# Patient Record
Sex: FEMALE | Race: WHITE | Employment: OTHER | URBAN - METROPOLITAN AREA
[De-identification: names, ages, dates, MRNs, and addresses within clinical notes are randomized per-mention and may not be internally consistent; named-entity substitution may affect disease eponyms.]

---

## 2017-01-01 ENCOUNTER — TELEPHONE (OUTPATIENT)
Dept: FAMILY MEDICINE CLINIC | Facility: CLINIC | Age: 82
End: 2017-01-01

## 2017-01-01 ENCOUNTER — OFFICE VISIT (OUTPATIENT)
Dept: FAMILY MEDICINE CLINIC | Facility: CLINIC | Age: 82
End: 2017-01-01

## 2017-01-01 ENCOUNTER — APPOINTMENT (OUTPATIENT)
Dept: LAB | Age: 82
End: 2017-01-01
Attending: FAMILY MEDICINE
Payer: MEDICARE

## 2017-01-01 ENCOUNTER — APPOINTMENT (OUTPATIENT)
Dept: GENERAL RADIOLOGY | Facility: HOSPITAL | Age: 82
DRG: 871 | End: 2017-01-01
Attending: INTERNAL MEDICINE
Payer: MEDICARE

## 2017-01-01 ENCOUNTER — APPOINTMENT (OUTPATIENT)
Dept: GENERAL RADIOLOGY | Facility: HOSPITAL | Age: 82
End: 2017-01-01
Attending: EMERGENCY MEDICINE
Payer: MEDICARE

## 2017-01-01 ENCOUNTER — APPOINTMENT (OUTPATIENT)
Dept: GENERAL RADIOLOGY | Facility: HOSPITAL | Age: 82
DRG: 871 | End: 2017-01-01
Attending: EMERGENCY MEDICINE
Payer: MEDICARE

## 2017-01-01 ENCOUNTER — SNF VISIT (OUTPATIENT)
Dept: INTERNAL MEDICINE CLINIC | Age: 82
End: 2017-01-01

## 2017-01-01 ENCOUNTER — HOSPITAL ENCOUNTER (INPATIENT)
Facility: HOSPITAL | Age: 82
LOS: 1 days | DRG: 871 | End: 2017-01-01
Attending: EMERGENCY MEDICINE | Admitting: HOSPITALIST
Payer: MEDICARE

## 2017-01-01 ENCOUNTER — HOSPITAL ENCOUNTER (OUTPATIENT)
Facility: HOSPITAL | Age: 82
Setting detail: HOSPITAL OUTPATIENT SURGERY
Discharge: NURSING, REHAB OR LONG-TERM CARE | End: 2017-01-01
Attending: SPECIALIST | Admitting: SPECIALIST
Payer: MEDICARE

## 2017-01-01 ENCOUNTER — HOSPITAL ENCOUNTER (OUTPATIENT)
Facility: HOSPITAL | Age: 82
Setting detail: OBSERVATION
Discharge: HOME | End: 2017-01-01
Attending: EMERGENCY MEDICINE | Admitting: HOSPITALIST
Payer: MEDICARE

## 2017-01-01 ENCOUNTER — HOSPITAL ENCOUNTER (INPATIENT)
Facility: HOSPITAL | Age: 82
LOS: 3 days | Discharge: NURSING, REHAB OR LONG-TERM CARE | DRG: 177 | End: 2017-01-01
Attending: EMERGENCY MEDICINE | Admitting: HOSPITALIST
Payer: MEDICARE

## 2017-01-01 ENCOUNTER — APPOINTMENT (OUTPATIENT)
Dept: CV DIAGNOSTICS | Facility: HOSPITAL | Age: 82
End: 2017-01-01
Attending: HOSPITALIST
Payer: MEDICARE

## 2017-01-01 ENCOUNTER — SURGERY (OUTPATIENT)
Age: 82
End: 2017-01-01

## 2017-01-01 ENCOUNTER — APPOINTMENT (OUTPATIENT)
Dept: CT IMAGING | Facility: HOSPITAL | Age: 82
DRG: 871 | End: 2017-01-01
Attending: EMERGENCY MEDICINE
Payer: MEDICARE

## 2017-01-01 ENCOUNTER — SNF DISCHARGE (OUTPATIENT)
Dept: INTERNAL MEDICINE CLINIC | Age: 82
End: 2017-01-01

## 2017-01-01 ENCOUNTER — SNF ADMIT/H&P (OUTPATIENT)
Dept: FAMILY MEDICINE CLINIC | Facility: CLINIC | Age: 82
End: 2017-01-01

## 2017-01-01 ENCOUNTER — APPOINTMENT (OUTPATIENT)
Dept: GENERAL RADIOLOGY | Facility: HOSPITAL | Age: 82
DRG: 177 | End: 2017-01-01
Attending: HOSPITALIST
Payer: MEDICARE

## 2017-01-01 ENCOUNTER — APPOINTMENT (OUTPATIENT)
Dept: CT IMAGING | Facility: HOSPITAL | Age: 82
DRG: 177 | End: 2017-01-01
Attending: EMERGENCY MEDICINE
Payer: MEDICARE

## 2017-01-01 ENCOUNTER — ANESTHESIA EVENT (OUTPATIENT)
Dept: SURGERY | Facility: HOSPITAL | Age: 82
End: 2017-01-01

## 2017-01-01 ENCOUNTER — APPOINTMENT (OUTPATIENT)
Dept: GENERAL RADIOLOGY | Facility: HOSPITAL | Age: 82
DRG: 177 | End: 2017-01-01
Attending: EMERGENCY MEDICINE
Payer: MEDICARE

## 2017-01-01 ENCOUNTER — ANESTHESIA (OUTPATIENT)
Dept: SURGERY | Facility: HOSPITAL | Age: 82
End: 2017-01-01

## 2017-01-01 ENCOUNTER — MED REC SCAN ONLY (OUTPATIENT)
Dept: FAMILY MEDICINE CLINIC | Facility: CLINIC | Age: 82
End: 2017-01-01

## 2017-01-01 ENCOUNTER — APPOINTMENT (OUTPATIENT)
Dept: MRI IMAGING | Facility: HOSPITAL | Age: 82
DRG: 177 | End: 2017-01-01
Attending: EMERGENCY MEDICINE
Payer: MEDICARE

## 2017-01-01 ENCOUNTER — APPOINTMENT (OUTPATIENT)
Dept: GENERAL RADIOLOGY | Facility: HOSPITAL | Age: 82
DRG: 177 | End: 2017-01-01
Attending: INTERNAL MEDICINE
Payer: MEDICARE

## 2017-01-01 ENCOUNTER — LAB ENCOUNTER (OUTPATIENT)
Dept: LAB | Age: 82
End: 2017-01-01
Attending: FAMILY MEDICINE
Payer: COMMERCIAL

## 2017-01-01 ENCOUNTER — APPOINTMENT (OUTPATIENT)
Dept: GENERAL RADIOLOGY | Facility: HOSPITAL | Age: 82
DRG: 871 | End: 2017-01-01
Attending: NURSE PRACTITIONER
Payer: MEDICARE

## 2017-01-01 ENCOUNTER — APPOINTMENT (OUTPATIENT)
Dept: CT IMAGING | Facility: HOSPITAL | Age: 82
End: 2017-01-01
Attending: EMERGENCY MEDICINE
Payer: MEDICARE

## 2017-01-01 ENCOUNTER — APPOINTMENT (OUTPATIENT)
Dept: MRI IMAGING | Facility: HOSPITAL | Age: 82
End: 2017-01-01
Attending: HOSPITALIST
Payer: MEDICARE

## 2017-01-01 VITALS
OXYGEN SATURATION: 99 % | HEART RATE: 97 BPM | DIASTOLIC BLOOD PRESSURE: 75 MMHG | BODY MASS INDEX: 25.68 KG/M2 | RESPIRATION RATE: 18 BRPM | TEMPERATURE: 98 F | WEIGHT: 136 LBS | HEIGHT: 61 IN | SYSTOLIC BLOOD PRESSURE: 147 MMHG

## 2017-01-01 VITALS
RESPIRATION RATE: 20 BRPM | HEART RATE: 102 BPM | WEIGHT: 134 LBS | BODY MASS INDEX: 25 KG/M2 | DIASTOLIC BLOOD PRESSURE: 60 MMHG | SYSTOLIC BLOOD PRESSURE: 110 MMHG | TEMPERATURE: 98 F | OXYGEN SATURATION: 97 %

## 2017-01-01 VITALS
OXYGEN SATURATION: 98 % | HEART RATE: 94 BPM | WEIGHT: 136 LBS | BODY MASS INDEX: 26.01 KG/M2 | RESPIRATION RATE: 16 BRPM | SYSTOLIC BLOOD PRESSURE: 136 MMHG | HEIGHT: 60.75 IN | TEMPERATURE: 98 F | DIASTOLIC BLOOD PRESSURE: 80 MMHG

## 2017-01-01 VITALS
RESPIRATION RATE: 18 BRPM | DIASTOLIC BLOOD PRESSURE: 88 MMHG | HEART RATE: 68 BPM | SYSTOLIC BLOOD PRESSURE: 150 MMHG | TEMPERATURE: 98 F | WEIGHT: 136.5 LBS | BODY MASS INDEX: 26 KG/M2

## 2017-01-01 VITALS
DIASTOLIC BLOOD PRESSURE: 66 MMHG | SYSTOLIC BLOOD PRESSURE: 132 MMHG | HEIGHT: 61 IN | BODY MASS INDEX: 24.55 KG/M2 | RESPIRATION RATE: 20 BRPM | OXYGEN SATURATION: 97 % | TEMPERATURE: 99 F | HEART RATE: 107 BPM | WEIGHT: 130 LBS

## 2017-01-01 VITALS
HEIGHT: 60.75 IN | DIASTOLIC BLOOD PRESSURE: 80 MMHG | RESPIRATION RATE: 16 BRPM | SYSTOLIC BLOOD PRESSURE: 160 MMHG | OXYGEN SATURATION: 98 % | WEIGHT: 135 LBS | HEART RATE: 91 BPM | TEMPERATURE: 98 F | BODY MASS INDEX: 25.82 KG/M2

## 2017-01-01 VITALS
WEIGHT: 131.63 LBS | SYSTOLIC BLOOD PRESSURE: 181 MMHG | DIASTOLIC BLOOD PRESSURE: 89 MMHG | TEMPERATURE: 98 F | BODY MASS INDEX: 21.16 KG/M2 | OXYGEN SATURATION: 96 % | RESPIRATION RATE: 18 BRPM | HEIGHT: 66 IN | HEART RATE: 94 BPM

## 2017-01-01 VITALS
BODY MASS INDEX: 21 KG/M2 | RESPIRATION RATE: 20 BRPM | WEIGHT: 131.19 LBS | TEMPERATURE: 98 F | HEART RATE: 92 BPM | SYSTOLIC BLOOD PRESSURE: 141 MMHG | OXYGEN SATURATION: 99 % | DIASTOLIC BLOOD PRESSURE: 64 MMHG

## 2017-01-01 VITALS
OXYGEN SATURATION: 80 % | HEIGHT: 65 IN | DIASTOLIC BLOOD PRESSURE: 81 MMHG | WEIGHT: 143.31 LBS | TEMPERATURE: 98 F | SYSTOLIC BLOOD PRESSURE: 108 MMHG | BODY MASS INDEX: 23.88 KG/M2 | HEART RATE: 124 BPM | RESPIRATION RATE: 33 BRPM

## 2017-01-01 VITALS — HEART RATE: 95 BPM | OXYGEN SATURATION: 98 %

## 2017-01-01 DIAGNOSIS — I10 ESSENTIAL HYPERTENSION: ICD-10-CM

## 2017-01-01 DIAGNOSIS — J69.0 ASPIRATION PNEUMONIA, UNSPECIFIED ASPIRATION PNEUMONIA TYPE, UNSPECIFIED LATERALITY, UNSPECIFIED PART OF LUNG (HCC): ICD-10-CM

## 2017-01-01 DIAGNOSIS — D72.829 LEUKOCYTOSIS, UNSPECIFIED TYPE: ICD-10-CM

## 2017-01-01 DIAGNOSIS — R41.82 ALTERED MENTAL STATUS, UNSPECIFIED ALTERED MENTAL STATUS TYPE: Primary | ICD-10-CM

## 2017-01-01 DIAGNOSIS — Z91.81 AT RISK FOR FALLING: ICD-10-CM

## 2017-01-01 DIAGNOSIS — Z79.4 TYPE 2 DIABETES MELLITUS WITH COMPLICATION, WITH LONG-TERM CURRENT USE OF INSULIN (HCC): ICD-10-CM

## 2017-01-01 DIAGNOSIS — E11.51 DM (DIABETES MELLITUS) WITH PERIPHERAL VASCULAR COMPLICATION (HCC): ICD-10-CM

## 2017-01-01 DIAGNOSIS — K52.9 ACUTE COLITIS: ICD-10-CM

## 2017-01-01 DIAGNOSIS — G93.40 ACUTE ENCEPHALOPATHY: Primary | ICD-10-CM

## 2017-01-01 DIAGNOSIS — E87.0 HYPERNATREMIA: ICD-10-CM

## 2017-01-01 DIAGNOSIS — E11.8 TYPE 2 DIABETES MELLITUS WITH COMPLICATION, WITH LONG-TERM CURRENT USE OF INSULIN (HCC): ICD-10-CM

## 2017-01-01 DIAGNOSIS — K59.00 CONSTIPATION, UNSPECIFIED CONSTIPATION TYPE: ICD-10-CM

## 2017-01-01 DIAGNOSIS — L98.9 SKIN LESION OF LEFT LEG: Primary | ICD-10-CM

## 2017-01-01 DIAGNOSIS — I77.1 ILIAC ARTERY STENOSIS, BILATERAL (HCC): ICD-10-CM

## 2017-01-01 DIAGNOSIS — R42 DIZZINESS: Primary | ICD-10-CM

## 2017-01-01 DIAGNOSIS — I47.1 SVT (SUPRAVENTRICULAR TACHYCARDIA) (HCC): ICD-10-CM

## 2017-01-01 DIAGNOSIS — E87.6 HYPOKALEMIA: ICD-10-CM

## 2017-01-01 DIAGNOSIS — H53.9 VISUAL CHANGES: ICD-10-CM

## 2017-01-01 DIAGNOSIS — I25.10 CORONARY ARTERY DISEASE INVOLVING NATIVE CORONARY ARTERY OF NATIVE HEART WITHOUT ANGINA PECTORIS: ICD-10-CM

## 2017-01-01 DIAGNOSIS — I25.10 CORONARY ARTERY DISEASE INVOLVING NATIVE HEART WITHOUT ANGINA PECTORIS, UNSPECIFIED VESSEL OR LESION TYPE: ICD-10-CM

## 2017-01-01 DIAGNOSIS — E11.51 DM (DIABETES MELLITUS) WITH PERIPHERAL VASCULAR COMPLICATION (HCC): Primary | ICD-10-CM

## 2017-01-01 DIAGNOSIS — I10 BENIGN ESSENTIAL HTN: ICD-10-CM

## 2017-01-01 DIAGNOSIS — I73.9 PVD (PERIPHERAL VASCULAR DISEASE) (HCC): ICD-10-CM

## 2017-01-01 DIAGNOSIS — K56.609 LARGE BOWEL OBSTRUCTION (HCC): ICD-10-CM

## 2017-01-01 DIAGNOSIS — E11.9 TYPE 2 DIABETES MELLITUS WITHOUT COMPLICATION, WITH LONG-TERM CURRENT USE OF INSULIN (HCC): ICD-10-CM

## 2017-01-01 DIAGNOSIS — A41.9 SEPSIS DUE TO UNDETERMINED ORGANISM (HCC): Primary | ICD-10-CM

## 2017-01-01 DIAGNOSIS — E11.9 DM (DIABETES MELLITUS) (HCC): Primary | ICD-10-CM

## 2017-01-01 DIAGNOSIS — Z23 NEED FOR VACCINATION FOR STREP PNEUMONIAE: ICD-10-CM

## 2017-01-01 DIAGNOSIS — M17.11 PRIMARY OSTEOARTHRITIS OF RIGHT KNEE: ICD-10-CM

## 2017-01-01 DIAGNOSIS — L82.1 SEBORRHEIC KERATOSES: ICD-10-CM

## 2017-01-01 DIAGNOSIS — L03.116 CELLULITIS OF LEG WITHOUT FOOT, LEFT: ICD-10-CM

## 2017-01-01 DIAGNOSIS — E55.9 VITAMIN D DEFICIENCY: ICD-10-CM

## 2017-01-01 DIAGNOSIS — Z79.4 TYPE 2 DIABETES MELLITUS WITHOUT COMPLICATION, WITH LONG-TERM CURRENT USE OF INSULIN (HCC): ICD-10-CM

## 2017-01-01 DIAGNOSIS — G43.109 MIGRAINE EQUIVALENT: ICD-10-CM

## 2017-01-01 DIAGNOSIS — B37.2 CANDIDAL SKIN INFECTION: ICD-10-CM
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
## 2017-01-01 DIAGNOSIS — K92.2 UPPER GI BLEED: ICD-10-CM

## 2017-01-01 DIAGNOSIS — C44.92 CANCER, SKIN, SQUAMOUS CELL: ICD-10-CM

## 2017-01-01 DIAGNOSIS — N28.9 ACUTE RENAL INSUFFICIENCY: ICD-10-CM

## 2017-01-01 DIAGNOSIS — G45.8 OTHER SPECIFIED TRANSIENT CEREBRAL ISCHEMIAS: ICD-10-CM

## 2017-01-01 DIAGNOSIS — C44.92 SQUAMOUS CELL SKIN CANCER: ICD-10-CM

## 2017-01-01 DIAGNOSIS — E11.8 TYPE 2 DIABETES MELLITUS WITH COMPLICATION, WITHOUT LONG-TERM CURRENT USE OF INSULIN (HCC): ICD-10-CM

## 2017-01-01 DIAGNOSIS — I25.10 CAD (CORONARY ARTERY DISEASE): ICD-10-CM

## 2017-01-01 DIAGNOSIS — E87.2 METABOLIC ACIDOSIS: ICD-10-CM

## 2017-01-01 DIAGNOSIS — Z95.828 S/P INSERTION OF ILIAC ARTERY STENT: ICD-10-CM

## 2017-01-01 DIAGNOSIS — I10 ESSENTIAL HYPERTENSION WITH GOAL BLOOD PRESSURE LESS THAN 140/90: Primary | ICD-10-CM

## 2017-01-01 DIAGNOSIS — R53.1 WEAKNESS: ICD-10-CM

## 2017-01-01 DIAGNOSIS — H61.23 HEARING LOSS DUE TO CERUMEN IMPACTION, BILATERAL: Primary | ICD-10-CM

## 2017-01-01 DIAGNOSIS — D23.61: ICD-10-CM

## 2017-01-01 LAB
ALBUMIN SERPL-MCNC: 2.1 G/DL (ref 3.5–4.8)
ALBUMIN SERPL-MCNC: 2.1 G/DL (ref 3.5–4.8)
ALBUMIN SERPL-MCNC: 2.7 G/DL (ref 3.5–4.8)
ALBUMIN SERPL-MCNC: 3 G/DL (ref 3.5–4.8)
ALBUMIN SERPL-MCNC: 3.7 G/DL (ref 3.5–4.8)
ALLENS TEST: POSITIVE
ALP LIVER SERPL-CCNC: 104 U/L (ref 55–142)
ALP LIVER SERPL-CCNC: 166 U/L (ref 55–142)
ALP LIVER SERPL-CCNC: 58 U/L (ref 55–142)
ALP LIVER SERPL-CCNC: 66 U/L (ref 55–142)
ALP LIVER SERPL-CCNC: 98 U/L (ref 55–142)
ALT SERPL-CCNC: 16 U/L (ref 14–54)
ALT SERPL-CCNC: 17 U/L (ref 14–54)
ALT SERPL-CCNC: 17 U/L (ref 14–54)
ALT SERPL-CCNC: 18 U/L (ref 14–54)
ALT SERPL-CCNC: 25 U/L (ref 14–54)
AMMONIA: 13 UMOL/L (ref 11–32)
ANTIBODY SCREEN: POSITIVE
APTT PPP: 33.4 SECONDS (ref 25–34)
APTT PPP: 34.9 SECONDS (ref 25–34)
ARTERIAL BLD GAS O2 SATURATION: 90 % (ref 92–100)
ARTERIAL BLOOD GAS BASE EXCESS: -6.3
ARTERIAL BLOOD GAS HCO3: 17.8 MEQ/L (ref 22–26)
ARTERIAL BLOOD GAS PCO2: 31 MM HG (ref 35–45)
ARTERIAL BLOOD GAS PH: 7.38 (ref 7.35–7.45)
ARTERIAL BLOOD GAS PO2: 62 MM HG (ref 80–105)
AST SERPL-CCNC: 13 U/L (ref 15–41)
AST SERPL-CCNC: 17 U/L (ref 15–41)
AST SERPL-CCNC: 20 U/L (ref 15–41)
AST SERPL-CCNC: 24 U/L (ref 15–41)
AST SERPL-CCNC: 27 U/L (ref 15–41)
ATRIAL RATE: 101 BPM
ATRIAL RATE: 129 BPM
ATRIAL RATE: 93 BPM
BAND %: 25 %
BAND %: 25 %
BAND %: 28 %
BASOPHIL % MANUAL: 0 %
BASOPHIL ABSOLUTE MANUAL: 0 X10(3) UL (ref 0–0.1)
BASOPHILS # BLD AUTO: 0.01 X10(3) UL (ref 0–0.1)
BASOPHILS # BLD AUTO: 0.02 X10(3) UL (ref 0–0.1)
BASOPHILS NFR BLD AUTO: 0.1 %
BASOPHILS NFR BLD AUTO: 0.3 %
BASOPHILS NFR BLD AUTO: 0.3 %
BASOPHILS NFR BLD AUTO: 0.5 %
BILIRUB SERPL-MCNC: 0.6 MG/DL (ref 0.1–2)
BILIRUB SERPL-MCNC: 0.7 MG/DL (ref 0.1–2)
BILIRUB SERPL-MCNC: 0.7 MG/DL (ref 0.1–2)
BILIRUB SERPL-MCNC: 0.8 MG/DL (ref 0.1–2)
BILIRUB SERPL-MCNC: 1.1 MG/DL (ref 0.1–2)
BILIRUB UR QL STRIP.AUTO: NEGATIVE
BUN BLD-MCNC: 10 MG/DL (ref 8–20)
BUN BLD-MCNC: 11 MG/DL (ref 8–20)
BUN BLD-MCNC: 13 MG/DL (ref 8–20)
BUN BLD-MCNC: 17 MG/DL (ref 8–20)
BUN BLD-MCNC: 18 MG/DL (ref 8–20)
BUN BLD-MCNC: 42 MG/DL (ref 8–20)
BUN BLD-MCNC: 51 MG/DL (ref 8–20)
BUN BLD-MCNC: 9 MG/DL (ref 8–20)
BUN BLD-MCNC: 9 MG/DL (ref 8–20)
CALCIUM BLD-MCNC: 6.8 MG/DL (ref 8.3–10.3)
CALCIUM BLD-MCNC: 7.2 MG/DL (ref 8.3–10.3)
CALCIUM BLD-MCNC: 7.7 MG/DL (ref 8.3–10.3)
CALCIUM BLD-MCNC: 8.2 MG/DL (ref 8.3–10.3)
CALCIUM BLD-MCNC: 8.3 MG/DL (ref 8.3–10.3)
CALCIUM BLD-MCNC: 8.5 MG/DL (ref 8.3–10.3)
CALCIUM BLD-MCNC: 8.6 MG/DL (ref 8.3–10.3)
CALCIUM BLD-MCNC: 8.9 MG/DL (ref 8.3–10.3)
CALCIUM BLD-MCNC: 8.9 MG/DL (ref 8.3–10.3)
CALCULATED O2 SATURATION: 91 % (ref 92–100)
CARBOXYHEMOGLOBIN: 1.7 % SAT (ref 0–3)
CHLORIDE: 103 MMOL/L (ref 101–111)
CHLORIDE: 105 MMOL/L (ref 101–111)
CHLORIDE: 109 MMOL/L (ref 101–111)
CHLORIDE: 109 MMOL/L (ref 101–111)
CHLORIDE: 111 MMOL/L (ref 101–111)
CHLORIDE: 111 MMOL/L (ref 101–111)
CHLORIDE: 112 MMOL/L (ref 101–111)
CHLORIDE: 113 MMOL/L (ref 101–111)
CHLORIDE: 114 MMOL/L (ref 101–111)
CLARITY UR REFRACT.AUTO: CLEAR
CLARITY UR REFRACT.AUTO: CLEAR
CO2: 15 MMOL/L (ref 22–32)
CO2: 20 MMOL/L (ref 22–32)
CO2: 22 MMOL/L (ref 22–32)
CO2: 24 MMOL/L (ref 22–32)
CO2: 25 MMOL/L (ref 22–32)
CO2: 26 MMOL/L (ref 22–32)
CO2: 29 MMOL/L (ref 22–32)
COLOR UR AUTO: YELLOW
CREAT BLD-MCNC: 0.52 MG/DL (ref 0.55–1.02)
CREAT BLD-MCNC: 0.56 MG/DL (ref 0.55–1.02)
CREAT BLD-MCNC: 0.58 MG/DL (ref 0.55–1.02)
CREAT BLD-MCNC: 0.63 MG/DL (ref 0.55–1.02)
CREAT BLD-MCNC: 0.71 MG/DL (ref 0.55–1.02)
CREAT BLD-MCNC: 0.71 MG/DL (ref 0.55–1.02)
CREAT BLD-MCNC: 1.06 MG/DL (ref 0.55–1.02)
CREAT BLD-MCNC: 1.52 MG/DL (ref 0.55–1.02)
CREAT BLD-MCNC: 1.54 MG/DL (ref 0.55–1.02)
CREAT UR-SCNC: <13 MG/DL
ELUATE RESULT: POSITIVE
EOSINOPHIL # BLD AUTO: 0 X10(3) UL (ref 0–0.3)
EOSINOPHIL # BLD AUTO: 0.01 X10(3) UL (ref 0–0.3)
EOSINOPHIL # BLD AUTO: 0.03 X10(3) UL (ref 0–0.3)
EOSINOPHIL # BLD AUTO: 0.04 X10(3) UL (ref 0–0.3)
EOSINOPHIL % MANUAL: 0 %
EOSINOPHIL ABSOLUTE MANUAL: 0 X10(3) UL (ref 0–0.3)
EOSINOPHIL NFR BLD AUTO: 0 %
EOSINOPHIL NFR BLD AUTO: 0.2 %
EOSINOPHIL NFR BLD AUTO: 0.4 %
EOSINOPHIL NFR BLD AUTO: 1 %
ERYTHROCYTE [DISTWIDTH] IN BLOOD BY AUTOMATED COUNT: 15.1 % (ref 11.5–16)
ERYTHROCYTE [DISTWIDTH] IN BLOOD BY AUTOMATED COUNT: 15.2 % (ref 11.5–16)
ERYTHROCYTE [DISTWIDTH] IN BLOOD BY AUTOMATED COUNT: 15.2 % (ref 11.5–16)
ERYTHROCYTE [DISTWIDTH] IN BLOOD BY AUTOMATED COUNT: 15.3 % (ref 11.5–16)
ERYTHROCYTE [DISTWIDTH] IN BLOOD BY AUTOMATED COUNT: 15.5 % (ref 11.5–16)
ERYTHROCYTE [DISTWIDTH] IN BLOOD BY AUTOMATED COUNT: 15.7 % (ref 11.5–16)
ERYTHROCYTE [DISTWIDTH] IN BLOOD BY AUTOMATED COUNT: 16.2 % (ref 11.5–16)
EST. AVERAGE GLUCOSE BLD GHB EST-MCNC: 117 MG/DL (ref 68–126)
EST. AVERAGE GLUCOSE BLD GHB EST-MCNC: 117 MG/DL (ref 68–126)
EST. AVERAGE GLUCOSE BLD GHB EST-MCNC: 123 MG/DL (ref 68–126)
EST. AVERAGE GLUCOSE BLD GHB EST-MCNC: 123 MG/DL (ref 68–126)
GLUCOSE BLD-MCNC: 113 MG/DL (ref 65–99)
GLUCOSE BLD-MCNC: 114 MG/DL (ref 65–99)
GLUCOSE BLD-MCNC: 125 MG/DL (ref 65–99)
GLUCOSE BLD-MCNC: 130 MG/DL (ref 65–99)
GLUCOSE BLD-MCNC: 131 MG/DL (ref 65–99)
GLUCOSE BLD-MCNC: 132 MG/DL (ref 65–99)
GLUCOSE BLD-MCNC: 138 MG/DL (ref 65–99)
GLUCOSE BLD-MCNC: 138 MG/DL (ref 70–99)
GLUCOSE BLD-MCNC: 139 MG/DL (ref 65–99)
GLUCOSE BLD-MCNC: 140 MG/DL (ref 70–99)
GLUCOSE BLD-MCNC: 148 MG/DL (ref 65–99)
GLUCOSE BLD-MCNC: 151 MG/DL (ref 65–99)
GLUCOSE BLD-MCNC: 154 MG/DL (ref 65–99)
GLUCOSE BLD-MCNC: 154 MG/DL (ref 70–99)
GLUCOSE BLD-MCNC: 154 MG/DL (ref 70–99)
GLUCOSE BLD-MCNC: 161 MG/DL (ref 70–99)
GLUCOSE BLD-MCNC: 162 MG/DL (ref 65–99)
GLUCOSE BLD-MCNC: 166 MG/DL (ref 65–99)
GLUCOSE BLD-MCNC: 166 MG/DL (ref 65–99)
GLUCOSE BLD-MCNC: 167 MG/DL (ref 65–99)
GLUCOSE BLD-MCNC: 170 MG/DL (ref 70–99)
GLUCOSE BLD-MCNC: 173 MG/DL (ref 70–99)
GLUCOSE BLD-MCNC: 176 MG/DL (ref 65–99)
GLUCOSE BLD-MCNC: 179 MG/DL (ref 65–99)
GLUCOSE BLD-MCNC: 183 MG/DL (ref 65–99)
GLUCOSE BLD-MCNC: 190 MG/DL (ref 65–99)
GLUCOSE BLD-MCNC: 201 MG/DL (ref 65–99)
GLUCOSE BLD-MCNC: 202 MG/DL (ref 65–99)
GLUCOSE BLD-MCNC: 208 MG/DL (ref 70–99)
GLUCOSE BLD-MCNC: 217 MG/DL (ref 65–99)
GLUCOSE BLD-MCNC: 217 MG/DL (ref 65–99)
GLUCOSE BLD-MCNC: 218 MG/DL (ref 65–99)
GLUCOSE BLD-MCNC: 225 MG/DL (ref 65–99)
GLUCOSE BLD-MCNC: 226 MG/DL (ref 65–99)
GLUCOSE BLD-MCNC: 227 MG/DL (ref 65–99)
GLUCOSE BLD-MCNC: 230 MG/DL (ref 70–99)
GLUCOSE BLD-MCNC: 237 MG/DL (ref 65–99)
GLUCOSE BLD-MCNC: 240 MG/DL (ref 65–99)
GLUCOSE BLD-MCNC: 246 MG/DL (ref 65–99)
GLUCOSE BLD-MCNC: 250 MG/DL (ref 65–99)
GLUCOSE BLD-MCNC: 263 MG/DL (ref 65–99)
GLUCOSE BLD-MCNC: 52 MG/DL (ref 65–99)
GLUCOSE BLD-MCNC: 82 MG/DL (ref 65–99)
GLUCOSE BLD-MCNC: 95 MG/DL (ref 65–99)
GLUCOSE UR STRIP.AUTO-MCNC: >=500 MG/DL
GLUCOSE UR STRIP.AUTO-MCNC: >=500 MG/DL
GLUCOSE UR STRIP.AUTO-MCNC: NEGATIVE MG/DL
HAV IGM SER QL: 1.9 MG/DL (ref 1.7–3)
HAV IGM SER QL: 1.9 MG/DL (ref 1.7–3)
HAV IGM SER QL: 2.1 MG/DL (ref 1.7–3)
HBA1C MFR BLD HPLC: 5.7 % (ref ?–5.7)
HBA1C MFR BLD HPLC: 5.7 % (ref ?–5.7)
HBA1C MFR BLD HPLC: 5.9 % (ref ?–5.7)
HBA1C MFR BLD HPLC: 5.9 % (ref ?–5.7)
HCT VFR BLD AUTO: 34.4 % (ref 34–50)
HCT VFR BLD AUTO: 34.5 % (ref 34–50)
HCT VFR BLD AUTO: 35.1 % (ref 34–50)
HCT VFR BLD AUTO: 37.4 % (ref 34–50)
HCT VFR BLD AUTO: 38.5 % (ref 34–50)
HCT VFR BLD AUTO: 40.2 % (ref 34–50)
HCT VFR BLD AUTO: 42.5 % (ref 34–50)
HGB BLD-MCNC: 11.1 G/DL (ref 12–16)
HGB BLD-MCNC: 11.2 G/DL (ref 12–16)
HGB BLD-MCNC: 11.2 G/DL (ref 12–16)
HGB BLD-MCNC: 12.3 G/DL (ref 12–16)
HGB BLD-MCNC: 12.7 G/DL (ref 12–16)
HGB BLD-MCNC: 13.3 G/DL (ref 12–16)
HGB BLD-MCNC: 14 G/DL (ref 12–16)
IMMATURE GRANULOCYTE COUNT: 0.01 X10(3) UL (ref 0–1)
IMMATURE GRANULOCYTE COUNT: 0.01 X10(3) UL (ref 0–1)
IMMATURE GRANULOCYTE COUNT: 0.02 X10(3) UL (ref 0–1)
IMMATURE GRANULOCYTE COUNT: 0.03 X10(3) UL (ref 0–1)
IMMATURE GRANULOCYTE RATIO %: 0.1 %
IMMATURE GRANULOCYTE RATIO %: 0.2 %
IMMATURE GRANULOCYTE RATIO %: 0.3 %
IMMATURE GRANULOCYTE RATIO %: 0.3 %
INR BLD: 1.06 (ref 0.89–1.11)
INR BLD: 1.34 (ref 0.89–1.11)
IONIZED CALCIUM: 1.04 MMOL/L (ref 1.12–1.32)
KETONES UR STRIP.AUTO-MCNC: 20 MG/DL
KETONES UR STRIP.AUTO-MCNC: NEGATIVE MG/DL
KETONES UR STRIP.AUTO-MCNC: NEGATIVE MG/DL
L/M: 12 L/MIN
LACTIC ACID ARTERIAL: 2.2 MMOL/L (ref 0.5–2)
LACTIC ACID: 1.2 MMOL/L (ref 0.5–2)
LACTIC ACID: 1.5 MMOL/L (ref 0.5–2)
LACTIC ACID: 1.8 MMOL/L (ref 0.5–2)
LACTIC ACID: 1.9 MMOL/L (ref 0.5–2)
LACTIC ACID: 2.7 MMOL/L (ref 0.5–2)
LACTIC ACID: 2.7 MMOL/L (ref 0.5–2)
LACTIC ACID: 3.4 MMOL/L (ref 0.5–2)
LACTIC ACID: 3.6 MMOL/L (ref 0.5–2)
LEUKOCYTE ESTERASE UR QL STRIP.AUTO: NEGATIVE
LYMPHOCYTE % MANUAL: 4 %
LYMPHOCYTE % MANUAL: 5 %
LYMPHOCYTE % MANUAL: 7 %
LYMPHOCYTE ABSOLUTE MANUAL: 1.4 X10(3) UL (ref 0.9–4)
LYMPHOCYTE ABSOLUTE MANUAL: 1.44 X10(3) UL (ref 0.9–4)
LYMPHOCYTE ABSOLUTE MANUAL: 2.03 X10(3) UL (ref 0.9–4)
LYMPHOCYTES # BLD AUTO: 0.84 X10(3) UL (ref 0.9–4)
LYMPHOCYTES # BLD AUTO: 1.21 X10(3) UL (ref 0.9–4)
LYMPHOCYTES # BLD AUTO: 1.41 X10(3) UL (ref 0.9–4)
LYMPHOCYTES # BLD AUTO: 1.56 X10(3) UL (ref 0.9–4)
LYMPHOCYTES NFR BLD AUTO: 13.1 %
LYMPHOCYTES NFR BLD AUTO: 14 %
LYMPHOCYTES NFR BLD AUTO: 20.2 %
LYMPHOCYTES NFR BLD AUTO: 37.3 %
M PROTEIN MFR SERPL ELPH: 5.6 G/DL (ref 6.1–8.3)
M PROTEIN MFR SERPL ELPH: 6.1 G/DL (ref 6.1–8.3)
M PROTEIN MFR SERPL ELPH: 6.4 G/DL (ref 6.1–8.3)
M PROTEIN MFR SERPL ELPH: 6.7 G/DL (ref 6.1–8.3)
M PROTEIN MFR SERPL ELPH: 8.1 G/DL (ref 6.1–8.3)
MCH RBC QN AUTO: 29.9 PG (ref 27–33.2)
MCH RBC QN AUTO: 30.4 PG (ref 27–33.2)
MCH RBC QN AUTO: 30.4 PG (ref 27–33.2)
MCH RBC QN AUTO: 30.5 PG (ref 27–33.2)
MCH RBC QN AUTO: 30.8 PG (ref 27–33.2)
MCH RBC QN AUTO: 30.8 PG (ref 27–33.2)
MCH RBC QN AUTO: 31 PG (ref 27–33.2)
MCHC RBC AUTO-ENTMCNC: 31.9 G/DL (ref 31–37)
MCHC RBC AUTO-ENTMCNC: 32.3 G/DL (ref 31–37)
MCHC RBC AUTO-ENTMCNC: 32.5 G/DL (ref 31–37)
MCHC RBC AUTO-ENTMCNC: 32.9 G/DL (ref 31–37)
MCHC RBC AUTO-ENTMCNC: 32.9 G/DL (ref 31–37)
MCHC RBC AUTO-ENTMCNC: 33 G/DL (ref 31–37)
MCHC RBC AUTO-ENTMCNC: 33.1 G/DL (ref 31–37)
MCV RBC AUTO: 90.3 FL (ref 81–100)
MCV RBC AUTO: 92.6 FL (ref 81–100)
MCV RBC AUTO: 93.4 FL (ref 81–100)
MCV RBC AUTO: 93.9 FL (ref 81–100)
MCV RBC AUTO: 94 FL (ref 81–100)
MCV RBC AUTO: 95.4 FL (ref 81–100)
MCV RBC AUTO: 95.6 FL (ref 81–100)
METHEMOGLOBIN: 0.6 % SAT (ref 0.4–1.5)
MICROALBUMIN UR-MCNC: <0.5 MG/DL
MONOCYTE % MANUAL: 6 %
MONOCYTE % MANUAL: 7 %
MONOCYTE % MANUAL: 9 %
MONOCYTE ABSOLUTE MANUAL: 1.44 X10(3) UL (ref 0.1–0.6)
MONOCYTE ABSOLUTE MANUAL: 2.44 X10(3) UL (ref 0.1–0.6)
MONOCYTE ABSOLUTE MANUAL: 3.16 X10(3) UL (ref 0.1–0.6)
MONOCYTES # BLD AUTO: 0.17 X10(3) UL (ref 0.1–0.6)
MONOCYTES # BLD AUTO: 0.3 X10(3) UL (ref 0.1–0.6)
MONOCYTES # BLD AUTO: 0.4 X10(3) UL (ref 0.1–0.6)
MONOCYTES # BLD AUTO: 0.42 X10(3) UL (ref 0.1–0.6)
MONOCYTES NFR BLD AUTO: 2.8 %
MONOCYTES NFR BLD AUTO: 4.5 %
MONOCYTES NFR BLD AUTO: 5.7 %
MONOCYTES NFR BLD AUTO: 7.2 %
MORPHOLOGY: NORMAL
MORPHOLOGY: NORMAL
NEUTROPHIL ABS PRELIM: 17.74 X10 (3) UL (ref 1.3–6.7)
NEUTROPHIL ABS PRELIM: 2.25 X10 (3) UL (ref 1.3–6.7)
NEUTROPHIL ABS PRELIM: 31.31 X10 (3) UL (ref 1.3–6.7)
NEUTROPHIL ABS PRELIM: 35.34 X10 (3) UL (ref 1.3–6.7)
NEUTROPHIL ABS PRELIM: 4.94 X10 (3) UL (ref 1.3–6.7)
NEUTROPHIL ABS PRELIM: 5.11 X10 (3) UL (ref 1.3–6.7)
NEUTROPHIL ABS PRELIM: 7.57 X10 (3) UL (ref 1.3–6.7)
NEUTROPHIL ABSOLUTE MANUAL: 17.63 X10(3) UL (ref 1.3–6.7)
NEUTROPHIL ABSOLUTE MANUAL: 30.54 X10(3) UL (ref 1.3–6.7)
NEUTROPHIL ABSOLUTE MANUAL: 36.13 X10(3) UL (ref 1.3–6.7)
NEUTROPHILS # BLD AUTO: 2.25 X10(3) UL (ref 1.3–6.7)
NEUTROPHILS # BLD AUTO: 4.94 X10(3) UL (ref 1.3–6.7)
NEUTROPHILS # BLD AUTO: 5.11 X10(3) UL (ref 1.3–6.7)
NEUTROPHILS # BLD AUTO: 7.57 X10(3) UL (ref 1.3–6.7)
NEUTROPHILS % MANUAL: 61 %
NEUTROPHILS % MANUAL: 61 %
NEUTROPHILS % MANUAL: 62 %
NEUTROPHILS NFR BLD AUTO: 53.8 %
NEUTROPHILS NFR BLD AUTO: 73.3 %
NEUTROPHILS NFR BLD AUTO: 82 %
NEUTROPHILS NFR BLD AUTO: 82.4 %
NITRITE UR QL STRIP.AUTO: NEGATIVE
P AXIS: 51 DEGREES
P AXIS: 69 DEGREES
P AXIS: 76 DEGREES
P-R INTERVAL: 122 MS
P-R INTERVAL: 142 MS
P-R INTERVAL: 172 MS
PATIENT TEMPERATURE: 98.4 F
PH UR STRIP.AUTO: 5 [PH] (ref 4.5–8)
PH UR STRIP.AUTO: 7 [PH] (ref 4.5–8)
PH UR STRIP.AUTO: 7 [PH] (ref 4.5–8)
PLATELET # BLD AUTO: 163 10(3)UL (ref 150–450)
PLATELET # BLD AUTO: 203 10(3)UL (ref 150–450)
PLATELET # BLD AUTO: 227 10(3)UL (ref 150–450)
PLATELET # BLD AUTO: 258 10(3)UL (ref 150–450)
PLATELET # BLD AUTO: 291 10(3)UL (ref 150–450)
PLATELET # BLD AUTO: 335 10(3)UL (ref 150–450)
PLATELET # BLD AUTO: 391 10(3)UL (ref 150–450)
PLATELET # BLD AUTO: 513 10(3)UL (ref 150–450)
PLATELET MORPHOLOGY: NORMAL
PLATELET MORPHOLOGY: NORMAL
POTASSIUM BLOOD GAS: 3.9 MMOL/L (ref 3.6–5.1)
POTASSIUM SERPL-SCNC: 3 MMOL/L (ref 3.6–5.1)
POTASSIUM SERPL-SCNC: 3.1 MMOL/L (ref 3.6–5.1)
POTASSIUM SERPL-SCNC: 3.3 MMOL/L (ref 3.6–5.1)
POTASSIUM SERPL-SCNC: 3.5 MMOL/L (ref 3.6–5.1)
POTASSIUM SERPL-SCNC: 3.6 MMOL/L (ref 3.6–5.1)
POTASSIUM SERPL-SCNC: 3.6 MMOL/L (ref 3.6–5.1)
POTASSIUM SERPL-SCNC: 3.7 MMOL/L (ref 3.6–5.1)
POTASSIUM SERPL-SCNC: 3.9 MMOL/L (ref 3.6–5.1)
POTASSIUM SERPL-SCNC: 4 MMOL/L (ref 3.6–5.1)
POTASSIUM SERPL-SCNC: 4.1 MMOL/L (ref 3.6–5.1)
POTASSIUM SERPL-SCNC: 4.3 MMOL/L (ref 3.6–5.1)
POTASSIUM SERPL-SCNC: 4.7 MMOL/L (ref 3.6–5.1)
PROCALCITONIN SERPL-MCNC: 1.18 NG/ML (ref ?–0.11)
PROCALCITONIN SERPL-MCNC: 4.21 NG/ML (ref ?–0.11)
PROT UR STRIP.AUTO-MCNC: NEGATIVE MG/DL
PSA SERPL DL<=0.01 NG/ML-MCNC: 13.8 SECONDS (ref 12–14.3)
PSA SERPL DL<=0.01 NG/ML-MCNC: 16.7 SECONDS (ref 12–14.3)
Q-T INTERVAL: 330 MS
Q-T INTERVAL: 350 MS
Q-T INTERVAL: 370 MS
QRS DURATION: 66 MS
QRS DURATION: 68 MS
QRS DURATION: 70 MS
QTC CALCULATION (BEZET): 453 MS
QTC CALCULATION (BEZET): 460 MS
QTC CALCULATION (BEZET): 483 MS
R AXIS: -18 DEGREES
R AXIS: -33 DEGREES
R AXIS: -4 DEGREES
RBC # BLD AUTO: 3.6 X10(6)UL (ref 3.8–5.1)
RBC # BLD AUTO: 3.67 X10(6)UL (ref 3.8–5.1)
RBC # BLD AUTO: 3.68 X10(6)UL (ref 3.8–5.1)
RBC # BLD AUTO: 4.04 X10(6)UL (ref 3.8–5.1)
RBC # BLD AUTO: 4.1 X10(6)UL (ref 3.8–5.1)
RBC # BLD AUTO: 4.45 X10(6)UL (ref 3.8–5.1)
RBC # BLD AUTO: 4.55 X10(6)UL (ref 3.8–5.1)
RBC UR QL AUTO: NEGATIVE
RED CELL DISTRIBUTION WIDTH-SD: 49.8 FL (ref 35.1–46.3)
RED CELL DISTRIBUTION WIDTH-SD: 51.7 FL (ref 35.1–46.3)
RED CELL DISTRIBUTION WIDTH-SD: 51.9 FL (ref 35.1–46.3)
RED CELL DISTRIBUTION WIDTH-SD: 53.2 FL (ref 35.1–46.3)
RED CELL DISTRIBUTION WIDTH-SD: 53.8 FL (ref 35.1–46.3)
RED CELL DISTRIBUTION WIDTH-SD: 54.5 FL (ref 35.1–46.3)
RED CELL DISTRIBUTION WIDTH-SD: 54.8 FL (ref 35.1–46.3)
RH BLOOD TYPE: POSITIVE
SODIUM BLOOD GAS: 138 MMOL/L (ref 136–144)
SODIUM SERPL-SCNC: 137 MMOL/L (ref 136–144)
SODIUM SERPL-SCNC: 140 MMOL/L (ref 136–144)
SODIUM SERPL-SCNC: 140 MMOL/L (ref 136–144)
SODIUM SERPL-SCNC: 143 MMOL/L (ref 136–144)
SODIUM SERPL-SCNC: 144 MMOL/L (ref 136–144)
SODIUM SERPL-SCNC: 145 MMOL/L (ref 136–144)
SODIUM SERPL-SCNC: 146 MMOL/L (ref 136–144)
SP GR UR STRIP.AUTO: 1.01 (ref 1–1.03)
T AXIS: 59 DEGREES
T AXIS: 63 DEGREES
T AXIS: 64 DEGREES
TOTAL CELLS COUNTED: 100
TOTAL HEMOGLOBIN: 12.1 G/DL (ref 11.7–16)
TROPONIN: <0.046 NG/ML (ref ?–0.05)
TROPONIN: <0.046 NG/ML (ref ?–0.05)
TSI SER-ACNC: 2.41 MIU/ML (ref 0.35–5.5)
UROBILINOGEN UR STRIP.AUTO-MCNC: 4 MG/DL
UROBILINOGEN UR STRIP.AUTO-MCNC: <2 MG/DL
UROBILINOGEN UR STRIP.AUTO-MCNC: <2 MG/DL
VENTRICULAR RATE: 101 BPM
VENTRICULAR RATE: 129 BPM
VENTRICULAR RATE: 93 BPM
WBC # BLD AUTO: 20.5 X10(3) UL (ref 4–13)
WBC # BLD AUTO: 35.1 X10(3) UL (ref 4–13)
WBC # BLD AUTO: 4.2 X10(3) UL (ref 4–13)
WBC # BLD AUTO: 40.6 X10(3) UL (ref 4–13)
WBC # BLD AUTO: 6 X10(3) UL (ref 4–13)
WBC # BLD AUTO: 7 X10(3) UL (ref 4–13)
WBC # BLD AUTO: 9.2 X10(3) UL (ref 4–13)

## 2017-01-01 PROCEDURE — 71010 XR CHEST AP PORTABLE  (CPT=71010): CPT | Performed by: EMERGENCY MEDICINE

## 2017-01-01 PROCEDURE — 88305 TISSUE EXAM BY PATHOLOGIST: CPT | Performed by: FAMILY MEDICINE

## 2017-01-01 PROCEDURE — 82962 GLUCOSE BLOOD TEST: CPT

## 2017-01-01 PROCEDURE — 85025 COMPLETE CBC W/AUTO DIFF WBC: CPT | Performed by: HOSPITALIST

## 2017-01-01 PROCEDURE — 99238 HOSP IP/OBS DSCHRG MGMT 30/<: CPT | Performed by: HOSPITALIST

## 2017-01-01 PROCEDURE — 70553 MRI BRAIN STEM W/O & W/DYE: CPT | Performed by: EMERGENCY MEDICINE

## 2017-01-01 PROCEDURE — 71010 XR CHEST AP PORTABLE  (CPT=71010): CPT | Performed by: INTERNAL MEDICINE

## 2017-01-01 PROCEDURE — 85730 THROMBOPLASTIN TIME PARTIAL: CPT | Performed by: EMERGENCY MEDICINE

## 2017-01-01 PROCEDURE — G0009 ADMIN PNEUMOCOCCAL VACCINE: HCPCS | Performed by: FAMILY MEDICINE

## 2017-01-01 PROCEDURE — 85027 COMPLETE CBC AUTOMATED: CPT

## 2017-01-01 PROCEDURE — 93306 TTE W/DOPPLER COMPLETE: CPT

## 2017-01-01 PROCEDURE — 36415 COLL VENOUS BLD VENIPUNCTURE: CPT

## 2017-01-01 PROCEDURE — 99223 1ST HOSP IP/OBS HIGH 75: CPT | Performed by: HOSPITALIST

## 2017-01-01 PROCEDURE — 71010 XR CHEST AP PORTABLE  (CPT=71010): CPT

## 2017-01-01 PROCEDURE — 85007 BL SMEAR W/DIFF WBC COUNT: CPT

## 2017-01-01 PROCEDURE — 70450 CT HEAD/BRAIN W/O DYE: CPT

## 2017-01-01 PROCEDURE — 99220 INITIAL OBSERVATION CARE,LEVL III: CPT | Performed by: HOSPITALIST

## 2017-01-01 PROCEDURE — 70450 CT HEAD/BRAIN W/O DYE: CPT | Performed by: EMERGENCY MEDICINE

## 2017-01-01 PROCEDURE — 0JXP0ZB TRANSFER LEFT LOWER LEG SUBCUTANEOUS TISSUE AND FASCIA WITH SKIN AND SUBCUTANEOUS TISSUE, OPEN APPROACH: ICD-10-PCS | Performed by: SPECIALIST

## 2017-01-01 PROCEDURE — 84145 PROCALCITONIN (PCT): CPT | Performed by: INTERNAL MEDICINE

## 2017-01-01 PROCEDURE — 80053 COMPREHEN METABOLIC PANEL: CPT

## 2017-01-01 PROCEDURE — 83605 ASSAY OF LACTIC ACID: CPT | Performed by: EMERGENCY MEDICINE

## 2017-01-01 PROCEDURE — 85025 COMPLETE CBC W/AUTO DIFF WBC: CPT

## 2017-01-01 PROCEDURE — 0HRJXK4 REPLACEMENT OF LEFT UPPER LEG SKIN WITH NONAUTOLOGOUS TISSUE SUBSTITUTE, PARTIAL THICKNESS, EXTERNAL APPROACH: ICD-10-PCS | Performed by: SPECIALIST

## 2017-01-01 PROCEDURE — 3E033XZ INTRODUCTION OF VASOPRESSOR INTO PERIPHERAL VEIN, PERCUTANEOUS APPROACH: ICD-10-PCS | Performed by: HOSPITALIST

## 2017-01-01 PROCEDURE — 99223 1ST HOSP IP/OBS HIGH 75: CPT | Performed by: SURGERY

## 2017-01-01 PROCEDURE — 82043 UR ALBUMIN QUANTITATIVE: CPT

## 2017-01-01 PROCEDURE — 82570 ASSAY OF URINE CREATININE: CPT

## 2017-01-01 PROCEDURE — 71020 XR CHEST PA + LAT CHEST (CPT=71020): CPT | Performed by: EMERGENCY MEDICINE

## 2017-01-01 PROCEDURE — 71010 XR CHEST AP PORTABLE  (CPT=71010): CPT | Performed by: NURSE PRACTITIONER

## 2017-01-01 PROCEDURE — 69210 REMOVE IMPACTED EAR WAX UNI: CPT | Performed by: FAMILY MEDICINE

## 2017-01-01 PROCEDURE — 99233 SBSQ HOSP IP/OBS HIGH 50: CPT | Performed by: OTHER

## 2017-01-01 PROCEDURE — 82607 VITAMIN B-12: CPT

## 2017-01-01 PROCEDURE — 0JBP0ZZ EXCISION OF LEFT LOWER LEG SUBCUTANEOUS TISSUE AND FASCIA, OPEN APPROACH: ICD-10-PCS | Performed by: SPECIALIST

## 2017-01-01 PROCEDURE — 5A09357 ASSISTANCE WITH RESPIRATORY VENTILATION, LESS THAN 24 CONSECUTIVE HOURS, CONTINUOUS POSITIVE AIRWAY PRESSURE: ICD-10-PCS | Performed by: HOSPITALIST

## 2017-01-01 PROCEDURE — 99232 SBSQ HOSP IP/OBS MODERATE 35: CPT | Performed by: HOSPITALIST

## 2017-01-01 PROCEDURE — 99223 1ST HOSP IP/OBS HIGH 75: CPT | Performed by: OTHER

## 2017-01-01 PROCEDURE — 87040 BLOOD CULTURE FOR BACTERIA: CPT | Performed by: EMERGENCY MEDICINE

## 2017-01-01 PROCEDURE — 70553 MRI BRAIN STEM W/O & W/DYE: CPT

## 2017-01-01 PROCEDURE — 99239 HOSP IP/OBS DSCHRG MGMT >30: CPT | Performed by: HOSPITALIST

## 2017-01-01 PROCEDURE — 99214 OFFICE O/P EST MOD 30 MIN: CPT | Performed by: FAMILY MEDICINE

## 2017-01-01 PROCEDURE — 99217 OBSERVATION CARE DISCHARGE: CPT | Performed by: HOSPITALIST

## 2017-01-01 PROCEDURE — 95819 EEG AWAKE AND ASLEEP: CPT | Performed by: OTHER

## 2017-01-01 PROCEDURE — 83605 ASSAY OF LACTIC ACID: CPT | Performed by: INTERNAL MEDICINE

## 2017-01-01 PROCEDURE — 84132 ASSAY OF SERUM POTASSIUM: CPT | Performed by: HOSPITALIST

## 2017-01-01 PROCEDURE — 02HV33Z INSERTION OF INFUSION DEVICE INTO SUPERIOR VENA CAVA, PERCUTANEOUS APPROACH: ICD-10-PCS | Performed by: HOSPITALIST

## 2017-01-01 PROCEDURE — 99306 1ST NF CARE HIGH MDM 50: CPT | Performed by: FAMILY MEDICINE

## 2017-01-01 PROCEDURE — 83036 HEMOGLOBIN GLYCOSYLATED A1C: CPT | Performed by: HOSPITALIST

## 2017-01-01 PROCEDURE — 99231 SBSQ HOSP IP/OBS SF/LOW 25: CPT | Performed by: NURSE PRACTITIONER

## 2017-01-01 PROCEDURE — 99226 SUBSEQUENT OBSERVATION CARE: CPT | Performed by: HOSPITALIST

## 2017-01-01 PROCEDURE — 85610 PROTHROMBIN TIME: CPT | Performed by: EMERGENCY MEDICINE

## 2017-01-01 PROCEDURE — 84443 ASSAY THYROID STIM HORMONE: CPT | Performed by: HOSPITALIST

## 2017-01-01 PROCEDURE — 88305 TISSUE EXAM BY PATHOLOGIST: CPT | Performed by: SPECIALIST

## 2017-01-01 PROCEDURE — 85025 COMPLETE CBC W/AUTO DIFF WBC: CPT | Performed by: EMERGENCY MEDICINE

## 2017-01-01 PROCEDURE — 80048 BASIC METABOLIC PNL TOTAL CA: CPT | Performed by: HOSPITALIST

## 2017-01-01 PROCEDURE — 82306 VITAMIN D 25 HYDROXY: CPT

## 2017-01-01 PROCEDURE — 74230 X-RAY XM SWLNG FUNCJ C+: CPT | Performed by: HOSPITALIST

## 2017-01-01 PROCEDURE — 11100 BIOPSY OF SKIN LESION: CPT | Performed by: FAMILY MEDICINE

## 2017-01-01 PROCEDURE — 74000 XR ABDOMEN (1 VIEW) (CPT=74000): CPT | Performed by: INTERNAL MEDICINE

## 2017-01-01 PROCEDURE — 99310 SBSQ NF CARE HIGH MDM 45: CPT | Performed by: NURSE PRACTITIONER

## 2017-01-01 PROCEDURE — 90670 PCV13 VACCINE IM: CPT | Performed by: FAMILY MEDICINE

## 2017-01-01 PROCEDURE — 99316 NF DSCHRG MGMT 30 MIN+: CPT | Performed by: NURSE PRACTITIONER

## 2017-01-01 PROCEDURE — 74176 CT ABD & PELVIS W/O CONTRAST: CPT | Performed by: EMERGENCY MEDICINE

## 2017-01-01 PROCEDURE — 99308 SBSQ NF CARE LOW MDM 20: CPT | Performed by: NURSE PRACTITIONER

## 2017-01-01 PROCEDURE — 83735 ASSAY OF MAGNESIUM: CPT

## 2017-01-01 PROCEDURE — 93306 TTE W/DOPPLER COMPLETE: CPT | Performed by: INTERNAL MEDICINE

## 2017-01-01 DEVICE — IMPLANTABLE DEVICE: Type: IMPLANTABLE DEVICE | Site: LEG | Status: FUNCTIONAL

## 2017-01-01 RX ORDER — NALOXONE HYDROCHLORIDE 0.4 MG/ML
80 INJECTION, SOLUTION INTRAMUSCULAR; INTRAVENOUS; SUBCUTANEOUS AS NEEDED
Status: DISCONTINUED | OUTPATIENT
Start: 2017-01-01 | End: 2017-01-01

## 2017-01-01 RX ORDER — SODIUM CHLORIDE 9 MG/ML
1000 INJECTION, SOLUTION INTRAVENOUS ONCE
Status: COMPLETED | OUTPATIENT
Start: 2017-01-01 | End: 2017-01-01

## 2017-01-01 RX ORDER — ASPIRIN 300 MG
150 SUPPOSITORY, RECTAL RECTAL ONCE
Status: COMPLETED | OUTPATIENT
Start: 2017-01-01 | End: 2017-01-01

## 2017-01-01 RX ORDER — ACETAMINOPHEN 325 MG/1
650 TABLET ORAL EVERY 6 HOURS PRN
Status: DISCONTINUED | OUTPATIENT
Start: 2017-01-01 | End: 2017-01-01

## 2017-01-01 RX ORDER — ACETAMINOPHEN 10 MG/ML
1000 INJECTION, SOLUTION INTRAVENOUS EVERY 6 HOURS PRN
Status: DISCONTINUED | OUTPATIENT
Start: 2017-01-01 | End: 2017-01-01

## 2017-01-01 RX ORDER — LORAZEPAM 0.5 MG/1
0.5 TABLET ORAL NIGHTLY PRN
Status: DISCONTINUED | OUTPATIENT
Start: 2017-01-01 | End: 2017-01-01

## 2017-01-01 RX ORDER — TORSEMIDE 20 MG/1
20 TABLET ORAL DAILY
Qty: 90 TABLET | Refills: 1 | Status: SHIPPED | OUTPATIENT
Start: 2017-01-01 | End: 2017-01-01

## 2017-01-01 RX ORDER — HYDRALAZINE HYDROCHLORIDE 20 MG/ML
10 INJECTION INTRAMUSCULAR; INTRAVENOUS EVERY 6 HOURS PRN
Status: DISCONTINUED | OUTPATIENT
Start: 2017-01-01 | End: 2017-01-01

## 2017-01-01 RX ORDER — SIMVASTATIN 80 MG
80 TABLET ORAL NIGHTLY
Status: DISCONTINUED | OUTPATIENT
Start: 2017-01-01 | End: 2017-01-01

## 2017-01-01 RX ORDER — LORAZEPAM 2 MG/ML
1 INJECTION INTRAMUSCULAR ONCE
Status: COMPLETED | OUTPATIENT
Start: 2017-01-01 | End: 2017-01-01

## 2017-01-01 RX ORDER — ONDANSETRON 2 MG/ML
4 INJECTION INTRAMUSCULAR; INTRAVENOUS EVERY 6 HOURS PRN
Status: DISCONTINUED | OUTPATIENT
Start: 2017-01-01 | End: 2017-01-01

## 2017-01-01 RX ORDER — DEXTROSE MONOHYDRATE 50 MG/ML
INJECTION, SOLUTION INTRAVENOUS CONTINUOUS
Status: DISCONTINUED | OUTPATIENT
Start: 2017-01-01 | End: 2017-01-01

## 2017-01-01 RX ORDER — ATORVASTATIN CALCIUM 40 MG/1
40 TABLET, FILM COATED ORAL NIGHTLY
Status: CANCELLED | OUTPATIENT
Start: 2017-01-01

## 2017-01-01 RX ORDER — SODIUM CHLORIDE 0.9 % (FLUSH) 0.9 %
10 SYRINGE (ML) INJECTION EVERY 12 HOURS
Status: DISCONTINUED | OUTPATIENT
Start: 2017-01-01 | End: 2017-01-01

## 2017-01-01 RX ORDER — ERGOCALCIFEROL (VITAMIN D2) 1250 MCG
50000 CAPSULE ORAL WEEKLY
COMMUNITY
Start: 2017-08-30

## 2017-01-01 RX ORDER — MINERAL OIL
OIL (ML) MISCELLANEOUS AS NEEDED
Status: DISCONTINUED | OUTPATIENT
Start: 2017-01-01 | End: 2017-01-01 | Stop reason: HOSPADM

## 2017-01-01 RX ORDER — BLOOD-GLUCOSE METER
KIT MISCELLANEOUS
Qty: 100 EACH | Refills: 1 | Status: SHIPPED | OUTPATIENT
Start: 2017-01-01 | End: 2017-01-01

## 2017-01-01 RX ORDER — TORSEMIDE 20 MG/1
TABLET ORAL
Qty: 90 TABLET | Refills: 1 | OUTPATIENT
Start: 2017-01-01

## 2017-01-01 RX ORDER — OMEPRAZOLE 20 MG/1
40 CAPSULE, DELAYED RELEASE ORAL
COMMUNITY

## 2017-01-01 RX ORDER — DEXTROSE MONOHYDRATE 25 G/50ML
50 INJECTION, SOLUTION INTRAVENOUS
Status: DISCONTINUED | OUTPATIENT
Start: 2017-01-01 | End: 2017-01-01

## 2017-01-01 RX ORDER — HYDRALAZINE HYDROCHLORIDE 20 MG/ML
10 INJECTION INTRAMUSCULAR; INTRAVENOUS ONCE
Status: COMPLETED | OUTPATIENT
Start: 2017-01-01 | End: 2017-01-01

## 2017-01-01 RX ORDER — BUPIVACAINE HYDROCHLORIDE AND EPINEPHRINE 5; 5 MG/ML; UG/ML
INJECTION, SOLUTION EPIDURAL; INTRACAUDAL; PERINEURAL AS NEEDED
Status: DISCONTINUED | OUTPATIENT
Start: 2017-01-01 | End: 2017-01-01 | Stop reason: HOSPADM

## 2017-01-01 RX ORDER — NYSTATIN 100000 U/G
1 CREAM TOPICAL 2 TIMES DAILY
Qty: 30 G | Refills: 0 | Status: SHIPPED | OUTPATIENT
Start: 2017-01-01

## 2017-01-01 RX ORDER — CALCIUM CARBONATE/VITAMIN D3 600 MG-10
1 TABLET ORAL 2 TIMES DAILY
COMMUNITY

## 2017-01-01 RX ORDER — CEFAZOLIN SODIUM 1 G/3ML
INJECTION, POWDER, FOR SOLUTION INTRAMUSCULAR; INTRAVENOUS
Status: DISCONTINUED | OUTPATIENT
Start: 2017-01-01 | End: 2017-01-01 | Stop reason: HOSPADM

## 2017-01-01 RX ORDER — ONDANSETRON 4 MG/1
4 TABLET, ORALLY DISINTEGRATING ORAL EVERY 6 HOURS PRN
Status: DISCONTINUED | OUTPATIENT
Start: 2017-01-01 | End: 2017-01-01

## 2017-01-01 RX ORDER — TORSEMIDE 20 MG/1
20 TABLET ORAL DAILY
Qty: 90 TABLET | Refills: 1 | Status: SHIPPED | OUTPATIENT
Start: 2017-01-01

## 2017-01-01 RX ORDER — SCOLOPAMINE TRANSDERMAL SYSTEM 1 MG/1
1 PATCH, EXTENDED RELEASE TRANSDERMAL
Status: DISCONTINUED | OUTPATIENT
Start: 2017-01-01 | End: 2017-01-01

## 2017-01-01 RX ORDER — SODIUM CHLORIDE 9 MG/ML
INJECTION, SOLUTION INTRAVENOUS CONTINUOUS
Status: DISCONTINUED | OUTPATIENT
Start: 2017-01-01 | End: 2017-01-01

## 2017-01-01 RX ORDER — LANCETS 28 GAUGE
EACH MISCELLANEOUS
Refills: 2 | Status: ON HOLD | COMMUNITY
Start: 2017-01-01 | End: 2017-01-01

## 2017-01-01 RX ORDER — PEN NEEDLE, DIABETIC 31 GX5/16"
NEEDLE, DISPOSABLE MISCELLANEOUS
Qty: 30 EACH | Refills: 2 | Status: SHIPPED | OUTPATIENT
Start: 2017-01-01 | End: 2017-01-01

## 2017-01-01 RX ORDER — ASPIRIN 325 MG
162 TABLET ORAL NIGHTLY
Status: DISCONTINUED | OUTPATIENT
Start: 2017-01-01 | End: 2017-01-01

## 2017-01-01 RX ORDER — SULFAMETHOXAZOLE AND TRIMETHOPRIM 800; 160 MG/1; MG/1
1 TABLET ORAL 2 TIMES DAILY
Qty: 14 TABLET | Refills: 0 | Status: ON HOLD | COMMUNITY
Start: 2017-01-01 | End: 2017-01-01

## 2017-01-01 RX ORDER — HEPARIN SODIUM 5000 [USP'U]/ML
5000 INJECTION, SOLUTION INTRAVENOUS; SUBCUTANEOUS EVERY 8 HOURS
Status: DISCONTINUED | OUTPATIENT
Start: 2017-01-01 | End: 2017-01-01

## 2017-01-01 RX ORDER — ACETAMINOPHEN 325 MG/1
650 TABLET ORAL EVERY 4 HOURS PRN
COMMUNITY

## 2017-01-01 RX ORDER — POTASSIUM CHLORIDE 14.9 MG/ML
20 INJECTION INTRAVENOUS ONCE
Status: COMPLETED | OUTPATIENT
Start: 2017-01-01 | End: 2017-01-01

## 2017-01-01 RX ORDER — PEN NEEDLE, DIABETIC 31 GX5/16"
NEEDLE, DISPOSABLE MISCELLANEOUS
Qty: 30 EACH | Refills: 1 | Status: ON HOLD | OUTPATIENT
Start: 2017-01-01 | End: 2017-01-01

## 2017-01-01 RX ORDER — DEXTROSE AND SODIUM CHLORIDE 5; .45 G/100ML; G/100ML
INJECTION, SOLUTION INTRAVENOUS CONTINUOUS
Status: DISCONTINUED | OUTPATIENT
Start: 2017-01-01 | End: 2017-01-01

## 2017-01-01 RX ORDER — POLYETHYLENE GLYCOL 3350 17 G/17G
17 POWDER, FOR SOLUTION ORAL
COMMUNITY

## 2017-01-01 RX ORDER — TORSEMIDE 20 MG/1
20 TABLET ORAL DAILY
Status: DISCONTINUED | OUTPATIENT
Start: 2017-01-01 | End: 2017-01-01

## 2017-01-01 RX ORDER — ONDANSETRON 2 MG/ML
4 INJECTION INTRAMUSCULAR; INTRAVENOUS ONCE
Status: COMPLETED | OUTPATIENT
Start: 2017-01-01 | End: 2017-01-01

## 2017-01-01 RX ORDER — ATENOLOL 50 MG/1
TABLET ORAL
Status: DISPENSED
Start: 2017-01-01 | End: 2017-01-01

## 2017-01-01 RX ORDER — METRONIDAZOLE 500 MG/100ML
500 INJECTION, SOLUTION INTRAVENOUS EVERY 8 HOURS
Status: DISCONTINUED | OUTPATIENT
Start: 2017-01-01 | End: 2017-01-01

## 2017-01-01 RX ORDER — CILOSTAZOL 50 MG/1
TABLET ORAL
Qty: 60 TABLET | Refills: 12 | Status: SHIPPED | OUTPATIENT
Start: 2017-01-01

## 2017-01-01 RX ORDER — INSULIN ASPART 100 [IU]/ML
INJECTION, SOLUTION INTRAVENOUS; SUBCUTANEOUS ONCE
Status: COMPLETED | OUTPATIENT
Start: 2017-01-01 | End: 2017-01-01

## 2017-01-01 RX ORDER — AZITHROMYCIN 250 MG/1
TABLET, FILM COATED ORAL
Qty: 6 TABLET | Refills: 0 | Status: SHIPPED | OUTPATIENT
Start: 2017-01-01 | End: 2017-01-01 | Stop reason: SINTOL

## 2017-01-01 RX ORDER — LORAZEPAM 0.5 MG/1
0.5 TABLET ORAL NIGHTLY PRN
COMMUNITY

## 2017-01-01 RX ORDER — BLOOD-GLUCOSE METER
KIT MISCELLANEOUS
Qty: 100 STRIP | Refills: 2 | Status: SHIPPED | OUTPATIENT
Start: 2017-01-01 | End: 2017-01-01

## 2017-01-01 RX ORDER — LORAZEPAM 2 MG/ML
2 INJECTION INTRAMUSCULAR EVERY 4 HOURS PRN
Status: DISCONTINUED | OUTPATIENT
Start: 2017-01-01 | End: 2017-01-01

## 2017-01-01 RX ORDER — LORAZEPAM 2 MG/ML
0.5 INJECTION INTRAMUSCULAR EVERY 4 HOURS PRN
Status: DISCONTINUED | OUTPATIENT
Start: 2017-01-01 | End: 2017-01-01

## 2017-01-01 RX ORDER — CILOSTAZOL 100 MG/1
50 TABLET ORAL 2 TIMES DAILY
Status: DISCONTINUED | OUTPATIENT
Start: 2017-01-01 | End: 2017-01-01

## 2017-01-01 RX ORDER — IBUPROFEN 600 MG/1
600 TABLET ORAL ONCE AS NEEDED
Status: DISCONTINUED | OUTPATIENT
Start: 2017-01-01 | End: 2017-01-01

## 2017-01-01 RX ORDER — SODIUM CHLORIDE, SODIUM LACTATE, POTASSIUM CHLORIDE, CALCIUM CHLORIDE 600; 310; 30; 20 MG/100ML; MG/100ML; MG/100ML; MG/100ML
INJECTION, SOLUTION INTRAVENOUS CONTINUOUS
Status: DISCONTINUED | OUTPATIENT
Start: 2017-01-01 | End: 2017-01-01

## 2017-01-01 RX ORDER — LABETALOL HYDROCHLORIDE 5 MG/ML
10 INJECTION, SOLUTION INTRAVENOUS ONCE
Status: COMPLETED | OUTPATIENT
Start: 2017-01-01 | End: 2017-01-01

## 2017-01-01 RX ORDER — ONDANSETRON 2 MG/ML
4 INJECTION INTRAMUSCULAR; INTRAVENOUS EVERY 4 HOURS PRN
Status: DISCONTINUED | OUTPATIENT
Start: 2017-01-01 | End: 2017-01-01

## 2017-01-01 RX ORDER — NIFEDIPINE 30 MG/1
TABLET, FILM COATED, EXTENDED RELEASE ORAL
Qty: 90 TABLET | Refills: 0 | Status: SHIPPED | OUTPATIENT
Start: 2017-01-01

## 2017-01-01 RX ORDER — DEXMEDETOMIDINE HYDROCHLORIDE 4 UG/ML
INJECTION, SOLUTION INTRAVENOUS CONTINUOUS
Status: DISCONTINUED | OUTPATIENT
Start: 2017-01-01 | End: 2017-01-01

## 2017-01-01 RX ORDER — MORPHINE SULFATE 4 MG/ML
INJECTION, SOLUTION INTRAMUSCULAR; INTRAVENOUS
Status: COMPLETED
Start: 2017-01-01 | End: 2017-01-01

## 2017-01-01 RX ORDER — DEXTROSE MONOHYDRATE 25 G/50ML
50 INJECTION, SOLUTION INTRAVENOUS
Status: DISCONTINUED | OUTPATIENT
Start: 2017-01-01 | End: 2017-01-01 | Stop reason: HOSPADM

## 2017-01-01 RX ORDER — BISACODYL 10 MG
10 SUPPOSITORY, RECTAL RECTAL
COMMUNITY

## 2017-01-01 RX ORDER — ENOXAPARIN SODIUM 100 MG/ML
40 INJECTION SUBCUTANEOUS DAILY
Status: DISCONTINUED | OUTPATIENT
Start: 2017-01-01 | End: 2017-01-01

## 2017-01-01 RX ORDER — CALCIUM CARBONATE 200(500)MG
2 TABLET,CHEWABLE ORAL 4 TIMES DAILY PRN
COMMUNITY

## 2017-01-01 RX ORDER — HYDROCODONE BITARTRATE AND ACETAMINOPHEN 5; 325 MG/1; MG/1
2 TABLET ORAL AS NEEDED
Status: DISCONTINUED | OUTPATIENT
Start: 2017-01-01 | End: 2017-01-01

## 2017-01-01 RX ORDER — NIFEDIPINE 30 MG/1
30 TABLET, EXTENDED RELEASE ORAL DAILY
Status: DISCONTINUED | OUTPATIENT
Start: 2017-01-01 | End: 2017-01-01

## 2017-01-01 RX ORDER — ALBUMIN, HUMAN INJ 5% 5 %
250 SOLUTION INTRAVENOUS ONCE
Status: COMPLETED | OUTPATIENT
Start: 2017-01-01 | End: 2017-01-01

## 2017-01-01 RX ORDER — DOCUSATE SODIUM 100 MG/1
100 CAPSULE, LIQUID FILLED ORAL 2 TIMES DAILY
COMMUNITY

## 2017-01-01 RX ORDER — LORAZEPAM 2 MG/ML
1 INJECTION INTRAMUSCULAR EVERY 4 HOURS PRN
Status: DISCONTINUED | OUTPATIENT
Start: 2017-01-01 | End: 2017-01-01

## 2017-01-01 RX ORDER — ONDANSETRON 2 MG/ML
4 INJECTION INTRAMUSCULAR; INTRAVENOUS AS NEEDED
Status: DISCONTINUED | OUTPATIENT
Start: 2017-01-01 | End: 2017-01-01

## 2017-01-01 RX ORDER — ASPIRIN 81 MG/1
162 TABLET, CHEWABLE ORAL NIGHTLY
Status: DISCONTINUED | OUTPATIENT
Start: 2017-01-01 | End: 2017-01-01

## 2017-01-01 RX ORDER — AMOXICILLIN AND CLAVULANATE POTASSIUM 500; 125 MG/1; MG/1
1 TABLET, FILM COATED ORAL 2 TIMES DAILY
Qty: 14 TABLET | Refills: 0 | Status: SHIPPED | OUTPATIENT
Start: 2017-01-01 | End: 2017-01-01

## 2017-01-01 RX ORDER — SIMVASTATIN 80 MG
TABLET ORAL
Qty: 90 TABLET | Refills: 1 | Status: SHIPPED | OUTPATIENT
Start: 2017-01-01

## 2017-01-01 RX ORDER — ACETAMINOPHEN 650 MG/1
1300 SUPPOSITORY RECTAL ONCE
Status: COMPLETED | OUTPATIENT
Start: 2017-01-01 | End: 2017-01-01

## 2017-01-01 RX ORDER — ATENOLOL 50 MG/1
50 TABLET ORAL
Status: DISCONTINUED | OUTPATIENT
Start: 2017-01-01 | End: 2017-01-01

## 2017-01-01 RX ORDER — POTASSIUM CHLORIDE 750 MG/1
TABLET, FILM COATED, EXTENDED RELEASE ORAL
Qty: 180 TABLET | Refills: 1 | Status: SHIPPED | OUTPATIENT
Start: 2017-01-01

## 2017-01-01 RX ORDER — POTASSIUM CHLORIDE 20 MEQ/1
40 TABLET, EXTENDED RELEASE ORAL EVERY 4 HOURS
Status: COMPLETED | OUTPATIENT
Start: 2017-01-01 | End: 2017-01-01

## 2017-01-01 RX ORDER — HEPARIN SODIUM 5000 [USP'U]/ML
5000 INJECTION, SOLUTION INTRAVENOUS; SUBCUTANEOUS EVERY 8 HOURS SCHEDULED
Status: DISCONTINUED | OUTPATIENT
Start: 2017-01-01 | End: 2017-01-01

## 2017-01-01 RX ORDER — ACETAMINOPHEN 650 MG/1
650 SUPPOSITORY RECTAL EVERY 6 HOURS PRN
Status: DISCONTINUED | OUTPATIENT
Start: 2017-01-01 | End: 2017-01-01

## 2017-01-01 RX ORDER — MORPHINE SULFATE 4 MG/ML
1 INJECTION, SOLUTION INTRAMUSCULAR; INTRAVENOUS
Status: DISCONTINUED | OUTPATIENT
Start: 2017-01-01 | End: 2017-01-01

## 2017-01-01 RX ORDER — HYDROMORPHONE HYDROCHLORIDE 1 MG/ML
0.2 INJECTION, SOLUTION INTRAMUSCULAR; INTRAVENOUS; SUBCUTANEOUS
Status: DISCONTINUED | OUTPATIENT
Start: 2017-01-01 | End: 2017-01-01

## 2017-01-01 RX ORDER — METOPROLOL TARTRATE 5 MG/5ML
5 INJECTION INTRAVENOUS EVERY 6 HOURS
Status: DISCONTINUED | OUTPATIENT
Start: 2017-01-01 | End: 2017-01-01

## 2017-01-01 RX ORDER — INSULIN GLARGINE 100 [IU]/ML
INJECTION, SOLUTION SUBCUTANEOUS
Qty: 9 ML | Refills: 12 | Status: SHIPPED | OUTPATIENT
Start: 2017-01-01

## 2017-01-01 RX ORDER — OFLOXACIN 3 MG/ML
SOLUTION/ DROPS OPHTHALMIC
Refills: 1 | Status: ON HOLD | COMMUNITY
Start: 2016-01-01 | End: 2017-01-01

## 2017-01-01 RX ORDER — ATENOLOL 25 MG/1
25 TABLET ORAL NIGHTLY
Status: DISCONTINUED | OUTPATIENT
Start: 2017-01-01 | End: 2017-01-01

## 2017-01-01 RX ORDER — HYDROCODONE BITARTRATE AND ACETAMINOPHEN 5; 325 MG/1; MG/1
1 TABLET ORAL AS NEEDED
Status: DISCONTINUED | OUTPATIENT
Start: 2017-01-01 | End: 2017-01-01

## 2017-01-01 RX ORDER — BLOOD-GLUCOSE METER
KIT MISCELLANEOUS
Refills: 2 | COMMUNITY
Start: 2017-01-01 | End: 2017-01-01

## 2017-01-01 RX ORDER — INSULIN ASPART 100 [IU]/ML
2 INJECTION, SOLUTION INTRAVENOUS; SUBCUTANEOUS ONCE
Status: COMPLETED | OUTPATIENT
Start: 2017-01-01 | End: 2017-01-01

## 2017-02-05 PROBLEM — R42 DIZZINESS: Status: ACTIVE | Noted: 2017-01-01

## 2017-02-05 PROBLEM — R53.1 WEAKNESS: Status: ACTIVE | Noted: 2017-01-01

## 2017-02-05 NOTE — ED NOTES
Pt up with nurse to bathroom. Murdock steady. Pt unable to see. Nurse able to help direct pt into restroom. Pt states that she is due to see eye doctor feb 23rd. pts son notified. Hospitalist aware.

## 2017-02-05 NOTE — ED PROVIDER NOTES
Patient Seen in: [facility] Emergency Department    History   Patient presents with:  Fall (musculoskeletal, neurologic)    Stated Complaint: fall    HPI    55-year-old female that comes the hospital after having had a fall and complaining of having 12/92    CATARACT  2/93    ANGIOPLASTY (CORONARY)  5/93    APPENDECTOMY  6/96    OTHER SURGICAL HISTORY  1/1/98    Comment bladder sx    OTHER SURGICAL HISTORY  10/02    Comment rotator cuff repair    OTHER SURGICAL HISTORY  3/07    Comment 3 stents    OTH complaint: fall  Other systems are as noted in HPI. Constitutional and vital signs reviewed. All other systems reviewed and negative except as noted above. PSFH elements reviewed from today and agreed except as otherwise stated in HPI.     Physical DIFFERENTIAL WITH PLATELET.   Procedure                               Abnormality         Status                     ---------                               -----------         ------                     CBC W/ DIFFERENTIAL[[id number]]          Abnormal Mastoid air cells are unremarkable. No calvarial lesions.                  XR CHEST AP PORTABLE  (CPT=71010) (Final result) Result time: 02/05/17 15:07:16     Final result by Sherice Christianson DO (02/05/17 15:07:16)     Impression:     CONCLUSION:    1.  No acu

## 2017-02-05 NOTE — H&P
[facility]  History and Physical     Yaya Steve Patient Status:  Emergency    1927 MRN [id number]   Location [address] [facility] Attending Dylon Patel MD   Hosp Day # 0 PCP Michelel Sullivan MD     Chief Complaint: Bayron Zuniga 1/1/75    REPAIR ROTATOR CUFF,ACUTE  1/1/02    CATARACT  12/92    CATARACT  2/93    ANGIOPLASTY (CORONARY)  5/93    APPENDECTOMY  6/96    OTHER SURGICAL HISTORY  1/1/98    Comment bladder sx    OTHER SURGICAL HISTORY  10/02    Comment rotator cuff repair Disp: 180 tablet Rfl: 1   Glucose Blood (ACCU-CHEK SMARTVIEW) In Vitro Strip Use as directed. Disp: 100 strip Rfl: 12   Insulin Glargine (LANTUS SOLOSTAR) 100 UNIT/ML Subcutaneous Solution Pen-injector Inject 30 Units into the skin daily.  Disp: 3 pen Rfl: hypoglycemia  2. Dehydration  1. Admit  2. IVF  3. Check orthostatics  4. Neuro checks  5. PT/OT  6. Fall precautions  3. Hypocalcemia  1. Replete  4. CAD sp PCI   5. Hypertensive crisis  6. PVD  7. TIA  1. Aspirin  2. Atenolol 25  3. Hold statin  4.  Hold

## 2017-02-05 NOTE — ED INITIAL ASSESSMENT (HPI)
[place]  today hit head on heat register, c/o dizziness at home. Pt lives alone in apartment, used life alert call system to activate 911. States did not eat breakfast, Accu check in field 240.  EMS states apartment was messy, broken items in apartment, stool a

## 2017-02-05 NOTE — ED NOTES
Pt up and able to walk with nurse assist. Pt states that she would prefer to be admitted to hospital. Md notified.

## 2017-02-05 NOTE — CM/SW NOTE
Patient lives alone. She does have a life alert, but family states it has not been working correctly.   Family is involved, taking her shopping and to MD appts, etc.  Son states he picks his mom up 2 times a week to do different things but admits he only go

## 2017-02-06 NOTE — HISTORICAL OFFICE NOTE
Efren Castleman  : 1927  ACCOUNT:  [id number]  [phone]  PCP: Dr. Jamia Mann     TODAY'S DATE: 2016  DICTATED BY:  Anastasiya Martínez MD]    CHIEF COMPLAINT: [Followup of .  CAD, of native vessels.]    HPI: [On 2016, Lary Billings, an 22-year-old f in no distress and obese. WEIGHT: BMI parameters reviewed and discussed. HEAD/FACE: no trauma and normocephalic. EYES: conjunctivae not injected and no xanthelasma. ENT: mucosa pink and moist. NECK: jugular venous pressure not elevated.  RESP: respirations EVER     09/23/15 Glimepiride           4MG       one tablet by mouth daily                09/23/15 Potassium Chloride ER 10MEQ     one capsule by mouth twice daily         05/18/15 Atenolol              100MG     1/2 tablet daily                         1

## 2017-02-06 NOTE — PLAN OF CARE
CARDIOVASCULAR - ADULT    • Absence of cardiac arrhythmias or at baseline Progressing          Diabetes/Glucose Control    • Glucose maintained within prescribed range Progressing          NEUROLOGICAL - ADULT    • Achieves stable or improved neurological

## 2017-02-06 NOTE — PLAN OF CARE
Pt is alert and oriented x 4 with slightly forgetful. Pt was fine this morning during change of shift.  At 0830 pt compliant that she had slight Numbness and Tingling on the Lips for a while before breakfast and got subsided and at 0845 hours, pt C/o headac

## 2017-02-06 NOTE — PROGRESS NOTES
[facility]  Progress Note     Brigitte Sole Patient Status:  Observation    1927 MRN [id number]   [facility] 4NW-A Attending Val Crowley MD   Hosp Day # 1 PCP Kvng Hernandez MD     Chief Complaint: Davie Palomino: Patient complain HS   • NIFEdipine ER  30 mg Oral Daily   • psyllium  1 packet Oral Daily   • simvastatin  80 mg Oral Nightly       ASSESSMENT / PLAN:     1. Fall, mechanical, suspect due to dehydration, but patient had SVT this morning.  No syncope PTA, but need to rule ou

## 2017-02-06 NOTE — OCCUPATIONAL THERAPY NOTE
Skilled OT eval order received, chart reviewed. Will HOLD OT eval pending stat MRI of the brain results. RN aware.  Angie Cadena, 02/06/2017, 2:12 PM

## 2017-02-06 NOTE — CM/SW NOTE
02/06/17 1400   CM/SW Referral Data   Referral Source    Reason for Referral Discharge planning   Informant Patient; Children   Pertinent Medical Hx   Primary Care Physician Name Smitha Vega   Date of Last Contact with PCP 1/30/17   Pedro Pablo

## 2017-02-06 NOTE — CONSULTS
[facility]  Cardiology Consultation    Meghann Zamorano Patient Status:  Observation    1927 MRN [id number]   [facility] 4NW-A Attending Muriel Cassidy MD   Hosp Day # 1 PCP Graeme Boxer, MD     Reason for Consultation:  Mindi Seymour JULIO NEEDLE LOCALIZATION W/ SPECIMEN 1 SITE LEFT  2002    Comment benign    ANGIOGRAM       Family History   Problem Relation Age of Onset   • Breast Cancer Daughter      in her 42's    • Heart Disorder Mother       reports that she quit smoking about 2 yea tab 80 mg, 80 mg, Oral, Nightly    Review of Systems:  A comprehensive review of systems was negative if not otherwise mention in above HPI.     /64 mmHg  Pulse 97  Temp(Src) 98 °F (36.7 °C) (Oral)  Resp 18  Ht 5' 1\" (1.549 m)  Wt 135 lb 3.2 oz (61.3 mg/dL 99   HDL Cholesterol Latest Ref Range: >45 mg/dL 62   VLDL Latest Ref Range: 5-40 mg/dL 20   T.  CHOL/HDL RATIO Latest Ref Range: <4.44  2.56   NON HDL CHOL Latest Ref Range: <130 mg/dL 97   LDL Cholesterol Calc Latest Ref Range: <130 mg/dL 77     Alona reproduce previous elevated velocity in the left proximal superficial femoral artery that had a peak velocity of 492 cm/s and a velocity ratio of 5.5.   On todays study there appears to be color filling the area of previous stenosis suggestive that ammonater

## 2017-02-07 NOTE — PLAN OF CARE
NURSING DISCHARGE NOTE    Discharged Home via Wheelchair. Accompanied by Support staff  Belongings Taken by patient/family. Pt and Pt's son verbalized understanding of Discharge instructions.

## 2017-02-07 NOTE — PROGRESS NOTES
[facility]  Cardiology Progress Note    Andrew Batista Patient Status:  Observation    1927 MRN [id number]   [facility] 4NW-A Attending Gaurav Roth MD   Hosp Day # 2 PCP Orquidea Kelley MD       Subjective: No events overnight.   Ray Faye Recent Labs   Lab  02/05/17   1403  02/06/17   0710   ALT  17  17   AST  27  17   ALB  2.7*  3.0*       Recent Labs   Lab  02/05/17   1403   TROP  <0.046       Allergies:     Lipitor [Atorvastat*        Comment:Nonspecific side effects  Metformin relaxation - grade 1 diastolic dysfunction. 2. Aortic valve: Trivial regurgitation. 3. Mitral valve: Mildly calcified annulus. 4. Pulmonary arteries: Systolic pressure was mildly increased, estimated to be 36mm Hg.   Impressions:  No previous study was a

## 2017-02-07 NOTE — PLAN OF CARE
Pt is alert and oriented x 4. Vitals stable. No C/o pain or discomfort. Seen by PT. Ambulated in the hallway. Pt denies any Dizziness. Fall precautions observed. Plan of care updated to pt's son Can Garcia. Will continue to monitor.

## 2017-02-07 NOTE — PLAN OF CARE
CARDIOVASCULAR - ADULT    • Absence of cardiac arrhythmias or at baseline Progressing        Diabetes/Glucose Control    • Glucose maintained within prescribed range Progressing        Impaired Functional Mobility    • Achieve highest/safest level of mobil

## 2017-02-07 NOTE — PROGRESS NOTES
[facility]  Progress Note     Shilo Orourke Patient Status:  Observation    1927 MRN [id number]   [facility] 4NW-A Attending Ambika Awad MD   Hosp Day # 2 PCP Celina Membreno MD     Chief Complaint: Stefania Kinsey: Patient doing we HS   • NIFEdipine ER  30 mg Oral Daily   • psyllium  1 packet Oral Daily   • simvastatin  80 mg Oral Nightly       ASSESSMENT / PLAN:     1. Fall, mechanical, suspect due to dehydration, but patient had SVT during admission, no hypoglycemia  2.  Dehydration

## 2017-02-07 NOTE — CM/SW NOTE
SW met w/pt and pt's son Caitlyn Feliciano. Jamey Sanchez stated he feels comfortable taking the pt home w/home health PT/RN. Pt's son was already given care giving resources. Son confirmed they are going to hire a caregiver. Son did not have a home health agency preference.  Valerio Areas

## 2017-02-07 NOTE — DISCHARGE SUMMARY
[facility]  DISCHARGE SUMMARY     Yaya Steve Patient Status:  Observation    1927 MRN [id number]   [facility] 4NW-A Attending Saud Gill MD   Hosp Day # 2 PCP Michelle Sullivan MD     Date of Admission: 2017  Date of Dis headache. CT / MRI negative for acute CVA. Patient also with run of SVT, ECHO okay, cardiology consulted. Patient doing well, plan for home with home health today. Family to arrange for caregiver.     Discharge Medication List:     Discharge Medications Psyllium 28.3 % Powd   Commonly known as:  METAMUCIL        1 rounded tablespoon daily in 8 ounces of water    Quantity:  660 g   Refills:  0       torsemide 20 MG Tabs   Commonly known as:  DEMADEX        Take 1 tablet (20 mg total) by mouth daily.     Laron Melgar

## 2017-02-07 NOTE — PHYSICAL THERAPY NOTE
PHYSICAL THERAPY QUICK EVALUATION - INPATIENT    Room Number: 423/423-A  Evaluation Date: 2/7/2017  Presenting Problem: Dizziness and weakness s/p fall  Physician Order: PT Eval and Treat    Problem List  Principal Problem:    Dizziness  Active Problems: Regularly Uses: Hearing aides    Prior Level of [place]: Pt reports she is indep c all ADLs and family assist c IADLs PRN; pt cooks, cleans and does her own laundry typically however has fallen in the past     SUBJECTIVE  \"I am doing much better than intermittent M/L sway primarily while performing turns. Pt did not present c any LOB. Pt then returned to her room and sat in [facility] c supervision.  PT discussed c pt and her son in detail the safety concerns regarding returning home sans any additional assist

## 2017-02-07 NOTE — OCCUPATIONAL THERAPY NOTE
OCCUPATIONAL THERAPY QUICK EVALUATION - INPATIENT    Room Number: 423/423-A  Evaluation Date: 2/7/2017     Type of Evaluation: Quick Eval  Presenting Problem: falls    Physician Order: IP Consult to Occupational Therapy  Reason for Therapy:  ADL/IADL Dysfu ANGIOGRAM         HOME SITUATION  Type of Home: Apartment  Home Layout: One level; Other (Comment) (elevator access)  Lives With: Alone    Toilet and Equipment: Standard height toilet;Grab bar  Shower/Tub and Equipment: Tub-shower combo;Grab bar  Other Equi Modifier (G-Code): CI    FUNCTIONAL TRANSFER ASSESSMENT  Supine to Sit : Not tested  Sit to Stand: Supervision    Skilled Therapy Provided: OT/PT facilitated patient to don/doff socks with no difficulty, ambulate in room, in hallways with RW approx 300 fee

## 2017-02-07 NOTE — HOME CARE LIAISON
Received referral for Residential Home Health. Met with patient who is agreeable to [facility]. Agency brochure provided to patient. Referral sent to [facility] via [address]. [facility] has accepted pt and will provide skilled nurse, PT/OT/HHA.        Thank you for this referral.

## 2017-02-08 NOTE — TELEPHONE ENCOUNTER
Pt is starting care with [facility] on 2/10/17 because she has an appointment with us on 2/9/17. Just FYI.

## 2017-02-08 NOTE — CM/SW NOTE
02/08/17 0900   Discharge disposition   Discharged to: Home or Self   Name of Facillity/Home Care/Hospice Residential   Discharge transportation Private car

## 2017-02-09 PROBLEM — R42 DIZZINESS: Status: RESOLVED | Noted: 2017-01-01 | Resolved: 2017-01-01

## 2017-02-09 PROBLEM — R53.1 WEAKNESS: Status: RESOLVED | Noted: 2017-01-01 | Resolved: 2017-01-01

## 2017-02-09 NOTE — PROGRESS NOTES
Here for follow-up of type 2 diabetes. She brings with her Accu-Chek book. Still running in the 150s in the evenings. However, her most recent hemoglobin A1c dated 3 days ago was 5.9. Her A1c in the fall was 6.     Was hospitalized for 3 days after a fa Comment rotator cuff repair    OTHER SURGICAL HISTORY  3/07    Comment 3 stents    OTHER SURGICAL HISTORY  12/94    Comment angina attack and angioplasty    JULIO NEEDLE LOCALIZATION W/ SPECIMEN 1 SITE LEFT  2002    Comment benign    ANGIOGRAM       MEDICATI light touch. Normal reflexes. Lower extremities visualized: No sores ulcers or rashes. Toe #2 status post nail removal on both feet.   Monofilament of both feet is normal.    Assessment diabetes type 2 well controlled #2 hypertension benign although labi

## 2017-04-13 NOTE — TELEPHONE ENCOUNTER
See 12/15/16 telephone note.  Pt does not want to change med, and will pay for nifedipine ER out of pocket,   Pt has tried amlodipine- caused shakiness/side effects  PA approved 12/30/16-12/30/17 due to pt's adverse reaction to amlodipine and it is continua

## 2017-04-25 NOTE — TELEPHONE ENCOUNTER
LANTUS: 4/12/16 #3 pens with 12 refills  CILOSTAZOL: 4/12/16 #60 with 12 refills  LOV: 2/9/17         RTC: 3 months

## 2017-04-27 NOTE — PROGRESS NOTES
Here at the request of her hearing aid specialist with cerumen in both ear canals affecting her hearing. She lost one hearing aid and needs a new one. They will not test her until the cerumen is removed.     Has a rash on her lower abdomen going on about HISTORY  3/07    Comment 3 stents    OTHER SURGICAL HISTORY  12/94    Comment angina attack and angioplasty    JULIO NEEDLE LOCALIZATION W/ SPECIMEN 1 SITE LEFT  2002    Comment benign    ANGIOGRAM       MEDICATIONS:    Current Outpatient Prescriptions:  BASILIO Metformin  FAMILY HISTORY  Family History   Problem Relation Age of Onset   • Breast Cancer Daughter      in her 42's    • Heart Disorder Mother        PHYSICAL EXAM:  /80 mmHg  Pulse 91  Temp(Src) 97.7 °F (36.5 °C) (Oral)  Resp 16  Ht 60.75\"  Wt 13

## 2017-04-27 NOTE — PATIENT INSTRUCTIONS
Labs in AUgust, already in system. Fast 8 hours for labs. Water, black coffee, or plain tea only. Any sugar in the system will alter the glucose level and triglyceride level. Follow up 1 week after labs    Call next week with update on leg.

## 2017-05-04 NOTE — TELEPHONE ENCOUNTER
Pt states left lower leg still sore. states had to have help to look at spot on leg since she has vision problems,  Still size of dime, red, NOT draining, NOT warm to touch, but it is sore, and pt bumped into that part of leg yesterday.  Using ointment and

## 2017-05-04 NOTE — TELEPHONE ENCOUNTER
So oral antibiotic was for leg. Nystatin cream was for abdomen. Place her on a z pack for leg cellultis. If not better in 10 days to follow up in office.

## 2017-05-05 NOTE — TELEPHONE ENCOUNTER
Called pt, informed pt the ABTX is for the leg and cream for the abdomen.    Dinah Valentine MD at 5/4/2017 10:17 AM      Status: Signed : Dinah Valentine MD (Physician)     Expand All Collapse All    So oral antibiotic was for leg.  Nystatin cream

## 2017-05-05 NOTE — TELEPHONE ENCOUNTER
Patient called, she picked up prescriptions, she has questions regarding the ointment that was prescribed for her. She states that she thought it was for something on her leg, but directions states it goes under her belly for her rash.        Please call

## 2017-05-12 NOTE — TELEPHONE ENCOUNTER
Abraham Winston called and stated that she was to update CZ on the sore on her leg. She stated that it is still raised. Has a little color around it. It looks like a pimple and the size didn't change. It is also dry. She finished her antibiotic.  She stated she was to

## 2017-05-13 NOTE — TELEPHONE ENCOUNTER
Unable to reach patient today although she does have appointment scheduled with  on 05/18/17 for half hour. Pedro Rey

## 2017-05-18 NOTE — PROGRESS NOTES
Lesion on the anterior left shin treated initially for cellulitis with Augmentin. After a week without improvement treated with Bactrim DS. Lesion persists. She denies fevers or chills. She accidentally used nystatin cream on it.   The nystatin was mean Disp: 14 tablet Rfl: 0   FREESTYLE LITE TEST In Vitro Strip  Disp:  Rfl: 2   FREESTYLE LANCETS Does not apply Misc  Disp:  Rfl: 2   nystatin 136521 UNIT/GM External Cream Apply 1 Application topically 2 (two) times daily.  Disp: 30 g Rfl: 0   LANTUS SOLOSTA epinephrine. Iodine prep ×3. Additional alcohol. Sterile field made with gauze pads. 4 mm punch biopsy performed including the area with a scab. The tissue was macerated and came out in 3 pieces. These were placed in formalin jar for pathology.   Angela

## 2017-05-23 NOTE — TELEPHONE ENCOUNTER
Spoke to son. Discussed well-differentiated squamous cell carcinoma. Because of its location on her anterior shin I recommended we have a full removal done by plastic surgery.   I have given him the numbers to Dr. Dennis Resendiz and David in [place] as well as

## 2017-06-24 PROBLEM — R41.82 ALTERED MENTAL STATUS: Status: ACTIVE | Noted: 2017-01-01

## 2017-06-24 PROBLEM — R41.82 ALTERED MENTAL STATUS, UNSPECIFIED ALTERED MENTAL STATUS TYPE: Status: ACTIVE | Noted: 2017-01-01

## 2017-06-24 NOTE — H&P
[facility]PITAL[facility]  History and Physical     Brooke Rincon Patient Status:  Emergency    1927 MRN [id number]   Location 6[address] [facility] Attending Art Hartman MD   Hosp Day # 0 PCP Josh Murillo MD     Chief Complaint: Nallely Flores JULIO NEEDLE LOCALIZATION W/ SPECIMEN 1 SITE LEFT      Comment: benign  1/1/93: OTHER      Comment: angioplasty, tranluminal coronary  1/1/98: OTHER SURGICAL HISTORY      Comment: bladder sx  10/02: OTHER SURGICAL HISTORY      Comment: rotator cuff repair  3 Rfl: 1   NIFEDIPINE ER 30 MG Oral Tablet 24 Hr TAKE 1 TABLET (30 MG TOTAL) BY MOUTH ONCE DAILY. Disp: 90 tablet Rfl: 0   ofloxacin 0.3 % Ophthalmic Solution  Disp:  Rfl: 1   SIMVASTATIN 80 MG Oral Tab TAKE 1 TABLET (80 MG TOTAL) BY MOUTH NIGHTLY.  Disp: 90 days allowed. ). Recent Labs   Lab  06/24/17   1556   PTP  13.8   INR  1.06       No results for input(s): TROP, CK in the last 72 hours. Imaging: Imaging data reviewed in Epic. ASSESSMENT / PLAN:     1. Acute encephalopathy  1.  Etiology unclear

## 2017-06-24 NOTE — ED INITIAL ASSESSMENT (HPI)
Patient fell today, lives alone. She hit the right side of her head, there is a large bump. No blood thinners, no LOC. Patient is not acting normal according to son. She is not answering questions, she is jumbling her words, she is normally oriented x4.

## 2017-06-25 NOTE — PLAN OF CARE
Received report at 0730. Unable to assess orientation due to patient being asleep. Unable to complete neuro assessment as well due to this. Son at bedside. MRI negative and EEG negative for seizures. Neuro checks q4 per neurology.  ID on consult due to elev

## 2017-06-25 NOTE — ED PROVIDER NOTES
Patient Seen in: [facility] Emergency Department    History   Patient presents with:  Altered Mental Status (neurologic)  Fall (musculoskeletal, neurologic)    Stated Complaint: fall, twisted funny, AMS now    HPI    24-year-old white female who pre Comment: angioplasty, tranluminal coronary  1/1/98: OTHER SURGICAL HISTORY      Comment: bladder sx  10/02: OTHER SURGICAL HISTORY      Comment: rotator cuff repair  3/07: OTHER SURGICAL HISTORY      Comment: 3 stents  12/94: OTHER SURGICAL HISTORY      Co 1/11/1995  Alcohol use: Yes              Comment: very seldom a glass of wine      Review of Systems    Positive for stated complaint: fall, twisted funny, AMS now  Other systems are as noted in HPI. Constitutional and vital signs reviewed.       All other 1.06 (*)     GFR 46 (*)     All other components within normal limits   PTT, ACTIVATED - Abnormal; Notable for the following:     PTT 34.9 (*)     All other components within normal limits    Narrative:      The aPTT Heparin Therapeutic Range is approximate acute bronchitis not excluded. No focal infiltrate or effusion. CT scan of the head reveals:  FINDINGS:       VENTRICLES/SULCI:  Ventricles and sulci are prominent consistent with mild atrophy.   INTRACRANIAL: Estuardo Hove is periventricular low-attenuati fever.  Consider doing a spinal tap on the patient but then will reviewed her medical history it is discover the patient is on a blood thinning medication so are unable to do a spinal tap at this time. I discussed with the family they are understanding. Visual changes H53.9 Unknown

## 2017-06-25 NOTE — ED NOTES
Assumed care of this patient. Patient in MRI at time of shift change. MRI states that patient not tolerating supine position. Medication order obtained from MD. Will administer in MRI.

## 2017-06-25 NOTE — PROGRESS NOTES
[facility]  Progress Note     Lanora Cool Patient Status:  Inpatient    1927 MRN [id number]   [facility] 7NE-A Attending Fanny Rivera MD   Hosp Day # 1 PCP Alyssa Tijerina MD     Chief Complaint: Confusion    S: Patient seen potassium chloride  20 mEq Intravenous Once   • atenolol  50 mg Oral Daily Beta Blocker   • enoxaparin  40 mg Subcutaneous Daily   • insulin aspart  1-5 Units Subcutaneous TID CC and HS   • cefTRIAXone  2 g Intravenous Q12H       ASSESSMENT / PLAN:     1.

## 2017-06-25 NOTE — ED NOTES
Patient returns from MRI. Patient returned to monitor. Sinus tach noted with no apparent ectopy noted. Will continue to monitor.

## 2017-06-25 NOTE — PROGRESS NOTES
Told by PCT that BS was 52 at 1259. Initiated hypoglycemia protocol. D50 given and D5W started at 50mL/hr. Dr. Lorena Myles paged. BS at 1323 was 202.  BS at 1419 was 167. 1600 was 131

## 2017-06-25 NOTE — PROGRESS NOTES
[facility] Pharmacy Note:  Renal Adjustment for Rocephin (ceftriaxone)    Princess Shepherd is a 80year old female who has been prescribed Rocephin (ceftriaxone) 1 gm every 24 hrs. CrCl is estimated creatinine clearance is 33 mL/min (based on SCr of 1.06 mg/dL).  so th

## 2017-06-25 NOTE — CONSULTS
Neurology H&P    Radu Galeas Patient Status:  Inpatient    1927 MRN [id number]   [facility] 7NE-A Attending Chrystal Oppenheim, MD   Hosp Day # 1 PCP Vandana Pino MD     Subjective:  Radu Galeas is a(n) 80year old female with a PMH signi stenosis, bilateral (HCC)     Transient cerebral ischemia     Visual changes     Migraine equivalent     Dermatofibroma of elbow     Seborrheic keratoses     Primary osteoarthritis of right knee     Fall     Type 2 diabetes mellitus with complication, with Quit date: 1/11/1995  Alcohol use:  Yes              Comment: very seldom a glass of wine      Family History:  Family History   Problem Relation Age of Onset   • Breast Cancer Daughter      in her 42's    • Heart Disorder Mother Imaging:   MRI Brain ww/o6/24/17  FINDINGS:    INTRACRANIAL:  Stable chronic small vessel ischemic changes in the bilateral white matter, basal ganglia and brainstem. No evidence of acute hemorrhage, acute infarct or mass.  No extra-axial fluid collec

## 2017-06-25 NOTE — PROCEDURES
[facility] - ELECTROENCEPHALOGRAM (EEG) REPORT  Patient Name:  Princess Shepherd   MRN / CSN:  [id number] / [id number]   Date of Birth / Age:  2/21/1927 /  80year old   Encounter Date:  6/25/17         METHODS:  Twenty-two electrodes were applied according to the 10-20-e

## 2017-06-25 NOTE — CONSULTS
INFECTIOUS DISEASE [address] Patient Status:  Inpatient    1927 MRN [id number]   [facility] 7NE-A Attending Vamshi Lacey MD   Hosp Day # 1 PCP Court Theodore MD Onset   • Breast Cancer Daughter      in her 42's    • Heart Disorder Mother       reports that she has quit smoking. She quit smokeless tobacco use about 22 years ago. She reports that she drinks alcohol. She reports that she does not use drugs.     Allerg masses  Respiratory: decreased bs bilaterally  Cardiovascular: S1, S2.  Regular rate and rhythm. No murmurs. Abdomen: Soft, nontender, nondistended. Positive bowel sounds. Musculoskeletal: Full range of motion of all extremities. No swelling noted.   J Fever     PVD (peripheral vascular disease) (HCC)      ASSESSMENT/PLAN:  1.  Encephalopathy, workup in progress  -MRI shows no acute change  Fever x 1 unclear source for infection  -on ceftriaxone pending cx/LP was deferred due to anticoagulation and suspic

## 2017-06-26 NOTE — CM/SW NOTE
06/26/17 1500   CM/SW Referral Data   Referral Source Physician   Reason for Referral Discharge planning   Informant Children   Pertinent Medical Hx   Primary Care Physician Name Adis Martinez   Patient Info   Advanced directives?  Yes   Patient's Ment

## 2017-06-26 NOTE — SLP NOTE
ADULT SWALLOWING EVALUATION    ASSESSMENT & PLAN   ASSESSMENT  Order received for bedside swallow evaluation per CVA protocol. Per chart review:    80-year-old white female who presents to the emergency room today for complaint of fall and now confused. Swallow Function Score: Mild    PLAN   Diet Recommendations - Solids: Mechanical soft chopped  Diet Recommendations - Liquid: Nectar thick  Video swallow study in the am  Crush meds and give in pureed  Pt must be fed for safety - aspiration precautions Unable to assess    Voice Quality: Clear  Respiratory Status: Supplemental O2  Consistencies Trialed: Thin liquids; Nectar thick liquids;Puree;Hard solid  Method of Presentation: Staff/Clinician assistance;Spoon;Cup  Patient Positioning: Upright    Oral Pha

## 2017-06-26 NOTE — PLAN OF CARE
AO to person, 1L O2, NSR with ST  SBP elevated to low 200s, telephone orders for hydralazine PRN if SBP greater than 180 - metoprolol IV was also added since patient is NPO  Lemons in place - output WNL  Small bowel movement overnight  D5 running at 75 - BS

## 2017-06-26 NOTE — OCCUPATIONAL THERAPY NOTE
OCCUPATIONAL THERAPY EVALUATION - INPATIENT     Room Number: 5788/5894-O  Evaluation Date: 6/26/2017  Type of Evaluation: Initial  Presenting Problem: fall, confusion    Physician Order: IP Consult to Occupational Therapy  Reason for Therapy: ADL/IADL Dysf HISTORY      Comment: bladder sx  10/02: OTHER SURGICAL HISTORY      Comment: rotator cuff repair  3/07: OTHER SURGICAL HISTORY      Comment: 3 stents  12/94: OTHER SURGICAL HISTORY      Comment: angina attack and angioplasty  1/1/02: REPAIR ROTATOR CUFF,A functional limits     Upper extremity strength is within functional limits except for the following;  4/5    COORDINATION  Gross Motor    [facility]    Fine Motor    [facility]      ADDITIONAL TESTS                                    NEUROLOGICAL FINDINGS     Coordinati home earlier and hit her head.   In this OT evaluation patient presents with the following impairments: impaired safety awareness, decreased standing balance, decreased standing endurance, decreased activity endurance, and impaired ability to divert attenti bilateral AROM HEP (home exercise program). Additional Goals:  Pt will participate in cognition screening.

## 2017-06-26 NOTE — PROGRESS NOTES
[facility]                INFECTIOUS DISEASE PROGRESS NOTE    Radu Galeas Patient Status:  Inpatient    1927 MRN [id number]   [facility] 7NE-A Attending Sherwin Montanez, [address] Day # 2 PCP Vandana Pino MD     Antibiotics:c Collected: 06/24/17 2205   Order Status: Completed Lab Status: Preliminary result Updated: 06/25/17 2300   Specimen: Blood from Blood,peripheral     Blood Culture Result No Growth 1 Day   Blood Culture FREQ X 2 [[id number]] Collected: 06/24/17 2205   Order

## 2017-06-26 NOTE — PHYSICAL THERAPY NOTE
PHYSICAL THERAPY EVALUATION - INPATIENT     Room Number: 8487/5319-S  Evaluation Date: 6/26/2017  Type of Evaluation: Initial  Physician Order: PT Eval and Treat    Presenting Problem: AMS, fall  Reason for Therapy: Mobility Dysfunction and Discharge P repair  3/07: OTHER SURGICAL HISTORY      Comment: 3 stents  12/94: OTHER SURGICAL HISTORY      Comment: angina attack and angioplasty  1/1/02: [address] SITUATION  Type of Home: Apartment   Home Layout:  (elevator bldg-pt on second Poor  Dynamic Standing: Poor    ADDITIONAL TESTS  Additional Tests: Elderly Mobility Scale     Elderly Mobility Scale: 3/20                           NEUROLOGICAL FINDINGS  Neurological Findings: Coordination - Heel to Shin     Coordination - Heel to [place] jeremiah: improved slightly during amb. Pt amb with rw 100' with max a due to confusion, difficulty staying on task, dec safety awareness, and cont retropulsion during amb. Pt in chair , chair alarm activated, family present.   Discussed with son and DIL recommenda risk. Discussed with son and DIL who were present for PT eval.       DISCHARGE RECOMMENDATIONS  PT Discharge Recommendations: Sub-acute rehabilitation (estimated LOS 12-14 days)    PLAN  PT Treatment Plan: Bed mobility; Endurance; Energy conservation;Patient

## 2017-06-26 NOTE — PROGRESS NOTES
[facility] HOSPITALIST  Progress Note     Mickeal Poag Patient Status:  Inpatient    1927 MRN [id number]   [facility] 7NE-A Attending Ivan Maldonado MD   Hosp Day # 2 PCP Chato Dowell MD     Chief Complaint: Confusion    S: Patient seen PTP  13.8   INR  1.06       Recent Labs   Lab  06/24/17   1556   TROP  <0.046            Imaging: Imaging data reviewed in Epic.     Medications:   • metoprolol Tartrate  5 mg Intravenous Q6H   • potassium chloride 40mEq IVPB (peripheral line)  40 mEq Int

## 2017-06-26 NOTE — PLAN OF CARE
Problem: Impaired Functional Mobility  Goal: Achieve highest/safest level of mobility/gait  Interventions:  - Assess patient's functional ability and stability  - Promote increasing activity/tolerance for mobility and gait  - Educate and engage patient/fam

## 2017-06-27 NOTE — PROGRESS NOTES
[facility] HOSPITAL[facility]  Progress Note     Brigitte Givens Patient Status:  Inpatient    1927 MRN [id number]   [facility] 7NE-A Attending Gauri Nice MD   Hosp Day # 3 PCP Kvng Hernandez MD     Chief Complaint: Confusion    S: Patient seen displayed. Estimated Creatinine Clearance: 67.3 mL/min (based on SCr of 0.52 mg/dL).     Recent Labs   Lab  06/24/17   1556   PTP  13.8   INR  1.06       Recent Labs   Lab  06/24/17   1556   TROP  <0.046            Imaging: Imaging data reviewed in Ep

## 2017-06-27 NOTE — PROGRESS NOTES
[facility]                INFECTIOUS DISEASE PROGRESS NOTE    Will Ogden Patient Status:  Inpatient    1927 MRN [id number]   [facility] 7NE-A Attending Randal Mayorga, [address] [facility] Day # 3 PCP [place] Simons, MD     Antibiotics:c --    --    --    --     < > = values in this interval not displayed.        No results found for: [facility]  Microbiology  Blood Culture Indira Martinez X 2 [[id number]] Collected: 06/24/17 0793   Order Status: Completed Lab Status: Preliminary result Updated: 06/25/17 2

## 2017-06-27 NOTE — PROGRESS NOTES
[address] [facility] Neurology Progress Note    Shilo Orourke Patient Status:  Inpatient    1927 MRN [id number]   [facility] 7NE-A Attending Jung Moscoso, [address] [facility] Day # 3 PCP Celina Membreno MD         Subjective:  Shilo Orourke is a(n) 9 intermittent triphasic waves consistent with a moderate to marked encephalopthy. While not specific these triphasic waves are often seen in encephalopathies with a toxic-metabolic etiology.  No seizures are recorded.      HCT 6/24: CONCLUSION:  No acute cra slowing no seizures  3. Ammonia level 13  4. BC no growth day 2, lactic acid 1.5  5. UA negative  6. TSH 2.410  7. pro calcitonin 6/25 1.18  8. 1 documented fever 102.2 on 6/24 - no further fevers  1. ID following and on abx  2. S/p fall   1.  PT recs for S

## 2017-06-27 NOTE — DISCHARGE SUMMARY
[facility] HOSPITALIST  DISCHARGE SUMMARY     Shaka Lizarraga Patient Status:  Inpatient    1927 MRN [id number]   [facility] 7NE-A Attending No att. providers found   Hosp Day # 3 PCP Jamia Mann MD     Date of Admission: 2017  Date of Patient admitted due to acute encephalopathy and fever. Neurology and ID consulted. CT head, MRI brain, and EEG negative. Encephalopathy presumed secondary to aspiration pneumonia. Antibiotics managed by ID. Mentation returned to baseline. Fever resolved. known as:  K-DUR      TAKE 2 TABLETS BY MOUTH EVERY DAY   Quantity:  180 tablet  Refills:  1     Psyllium 28.3 % Powd  Commonly known as:  METAMUCIL      1 rounded tablespoon daily in 8 ounces of water   Quantity:  660 g  Refills:  0     simvastatin 80 MG

## 2017-06-27 NOTE — CM/SW NOTE
Pt is ready for d/c today. Pt was accepted at [facility]. Family would like pt to go by [employer] - they understand the cost.  Arranged Edw medicar (quique for let) to  pt at 6:00pm tonight.

## 2017-06-27 NOTE — SLP NOTE
ADULT VIDEOFLUOROSCOPIC SWALLOWING STUDY    Admission Date: 6/24/2017  Evaluation Date: 06/27/17  Radiologist: Dr. Faisal Espinoza    Dear Dr. Nina Moulton,  This letter is to inform you of Abhijeet Egan Videofluoroscopic Swallowing Study results and/or plan of treatme difficulty  Precautions: Aspiration      Reason for Referral: R/O aspiration      Family/Patient Goals:  None stated    ASSESSMENT   DYSPHAGIA ASSESSMENT  Test completed in conjunction with Radiologist.  Patient Positioned: Upright.   Patient Viewed: Ely Albrecht mild-min delay in mastication of solids; timely oral transit; no significant oral residue. Delay in pharyngeal response with thin and nectar thick liquids.   Thin liquid bolus ran to valleculae and spilled over into pyriform sinus prior to trigger of phary mins after meal, Feed patient, Supervision with meals with mod-max assistance 95 % of the time across 2 sessions. EDUCATION/INSTRUCTION  Reviewed results and recommendations with patient/family/caregiver.   Agreement/Understanding ve

## 2017-06-27 NOTE — PLAN OF CARE
Assumed care at 0730. A/Ox3. Confused to situation, and pleasantly confused at times. Pt's son in room majority of day. Chair alarm on and fall precautions instated. Needs attended to. Midline kept in place for MARIANELA, ok per Dr. Sharad Meehan.   Pt family update

## 2017-06-27 NOTE — PLAN OF CARE
AOx3 - Knows person, place, and time - does not know situation  Neuro q4h - no facial droop or one sided weakness - strength is a 4 for all extremities, equal bilaterally  RA, NSR/  Lemons removed, voiding - Slightly shaky when ambulating to the bathroom

## 2017-06-28 NOTE — PROGRESS NOTES
Radu Galeas  : 1927  Age 80year old  female patient is admitted to Facility: [place] [address] for MARIANELA after unwitnessed fall at home.     [address] [place] [address] Drive date:    17  Discharge date to MARIANELA:    17  ELOS:      Anticipa hyperlipidemia    • Peripheral vascular disease ([facility]    • Peripheral vascular disease due to secondary diabetes mellitus ([facility] 8/27/2015    Dr. Rica Montgomery, both legs   • Pneumonia, organism unspecified(486)    • Stroke [facility])     TIA   • Type II or unspecifie Tab CR TAKE 2 TABLETS BY MOUTH EVERY DAY Disp: 180 tablet Rfl: 1   nystatin 868329 UNIT/GM External Cream Apply 1 Application topically 2 (two) times daily.  Disp: 30 g Rfl: 0   LANTUS SOLOSTAR 100 UNIT/ML Subcutaneous Solution Pen-injector INJECT 30 UNITS denies excessive thirst or urination; denies unexpected wt gain or wt loss  ALLERGY/IMM.: denies food or seasonal allergies      PHYSICAL EXAM:  GENERAL HEALTH: well developed, well nourished, in no apparent distress; sitting on edge of bed  LINES, TUBES, 06/28/2017   ALT 31 06/28/2017   BILT 0.8 06/28/2017   TP 7.3 06/28/2017   ALB 3.4 (L) 06/28/2017   GLOBULT 2.7 01/19/2011   ALBGLOBRAT 1.5 01/19/2011    06/28/2017   K 3.6 06/28/2017    06/28/2017   CO2 20.0 (L) 06/28/2017       Lab Results  C daily  6. Torsemide 20 mg daily  7. Simvastatin 80 mg q HS  8. ASA as above  9. Pletal 50 mg BID    T2 DM  1. CHO controlled diet  2. Accu checks BID; notify if <70 or >300  3. Glimepiride 4 mg daily  4. Lantus 30u q HS  5.  Last A1C 5.7% on 6.24.17    Hypo

## 2017-06-28 NOTE — CM/SW NOTE
06/28/17 0900   Discharge disposition   Discharged to: Skilled Nurs   Name of [address]   Patient is Discharged to a [address] Yes   Discharge transportation [employer]

## 2017-06-29 NOTE — PROGRESS NOTES
Johnella Boast  : 1927  Age 80year old  female      CC--Patient presents with:  Nursing Home: Admitted to [facility] for subacute rehab  Fall: Aspiration pneumonia, toxic metabolic encephalopathy      H. P.I Johnella Boast is a 80year old female SHWETHA,ACUTE  Family History   Problem Relation Age of Onset   • Breast Cancer Daughter      in her 42's    • Heart Disorder Mother      Smoking status: Former Smoker                                                              Packs/day: 0.00      Years: 0. warm, dry; skin lesion suspicious for CA:  Oval, raised, lumpy, irregular shape, necrotic area noted.   WOUND: none  EYES: PERRLA, EOMI, sclera anicteric, conjunctiva normal; there is no nystagmus, no drainage from eyes, +IOP b/l  HENT: normocephalic; adriane therapy/Occupational Therapy/speech therapy    Records from the hospital reviewed  Estimated length of stay 10-14 days  Check her blood work in the morning      [phone]    [employer]

## 2017-06-30 NOTE — PROGRESS NOTES
Sammie Matthew, 2/21/1927, 80year old, female    Chief Complaint:  Patient presents with:   Follow - Up: AMS/aspiration PNA  Hyperglycemia       Subjective:  PMH significant for TIA, macular degeneration, hearing impairment, CAD s/p MI/PTCA placement, SVT, HTN cranial nerves intact II-XII, follows commands  PSYCHIATRIC: ---oriented x 2-3; affect appropriate     Medications reviewed: Yes    Diagnostics reviewed:     Accu check readings reviewed:  Fasting range 126-189; PP range 216-325    Assessment and plan:  S/P

## 2017-07-05 NOTE — BRIEF OP NOTE
Pre-Operative Diagnosis: SQUAMOUS CELL     Post-Operative Diagnosis: SQUAMOUS CELL     Procedure Performed:   Procedure(s):  EXCISION OF SQUAMOUS CELL CANCER TO LEFT LOWER LEG WITH SKIN GRAFT, AND EPIFIX APPLICATION TO DONOR SITE    Surgeon(s) and Role:

## 2017-07-05 NOTE — OPERATIVE REPORT
[facility]    PATIENT'S NAME: Marlene Katz   ATTENDING PHYSICIAN: Tiffany Espinoza M.D. OPERATING PHYSICIAN: Tiffany Espinoza M.D.    PATIENT ACCOUNT#:   [de-identified]    LOCATION:  PREOPASCC  PRE ASCC 7 EDWP 10  MEDICAL RECORD #:   [id number]       [facility] an ABD and tape. A One-Step splint was then applied to the left lower leg taking care to pad the bony prominences of the leg. The patient tolerated the procedure well and was taken to the recovery room in good condition.     Dictated By RASHMI Rogers

## 2017-07-05 NOTE — OR NURSING
DR Velma Meier NOTIFIED OF BLOOD SUGAR. DR. Shiraz Da Silva HAVE [facility] RESUME DIABETIC CARE. TAMMY RN FROM [facility] GIVEN REPORT AND TO RESUME DIABETIC CARE.

## 2017-07-05 NOTE — CM/SW NOTE
MSW returned call to Dr. Ennis Army office [phone] regarding patient d/c plan. Dr. Bob Llanes wanted to know if patient can go back to [facility] today or would she need 3 midnights again like last week.  MSW advised if she had her 3 midnights already to go to [facility] t

## 2017-07-05 NOTE — ANESTHESIA PREPROCEDURE EVALUATION
PRE-OP EVALUATION    Patient Name: Venkatesh Mackenzie    Pre-op Diagnosis: SQUAMOUS CELL    Procedure(s):  EXCISION OF SQUAMOUS CELL CANCER TO LEFT LOWER LEG WITH SKIN GRAFT, COMPLEX CLOSURE    Surgeon(s) and Role:     * Usha Montalvo MD - Primary    Pre-op vit TABLET (50 MG TOTAL) BY MOUTH 2 (TWO) TIMES DAILY. Disp: 60 tablet Rfl: 12   torsemide 20 MG Oral Tab Take 1 tablet (20 mg total) by mouth daily.  Disp: 90 tablet Rfl: 1   NIFEDIPINE ER 30 MG Oral Tablet 24 Hr TAKE 1 TABLET (30 MG TOTAL) BY MOUTH ONCE DAILY involving native heart without angina pectoris     Occlusion and stenosis of carotid artery without mention of cerebral infarction     S/P insertion of iliac artery stent     DM (diabetes mellitus) with peripheral vascular complication (HCC)     At risk fo 32.5 07/05/2017   RDW 15.1 07/05/2017   .0 07/05/2017       Lab Results  Component Value Date    07/05/2017   K 3.3 (L) 07/05/2017    07/05/2017   CO2 26.0 07/05/2017   BUN 18 07/05/2017   CREATSERUM 0.63 07/05/2017    (H) 07/05/2

## 2017-07-05 NOTE — H&P
History & Physical Examination    Patient Name: Ramiro Chaudhry  MRN: [id number]  [facility]: [id number]  YOB: 1927    Diagnosis: Left leg squamous cell cancer    Present Illness: ([de-identified]year old woman with biopsy proven squamous cell cancer     Prescriptions Disp:  Rfl:  7/3/2017         Current Facility-Administered Medications:  lactated ringers infusion  Intravenous Continuous   glucose (DEX4) oral liquid 15 g 15 g Oral Q15 Min PRN   Or      Glucose-Vitamin C (DEX-4) 4-0.006 g chewable tab 4 tablet 4 tablet angioplasty  1/1/02: REPAIR ROTATOR CUFF,ACUTE  Family History   Problem Relation Age of Onset   • Breast Cancer Daughter      in her 42's    • Heart Disorder Mother         Smoking status: Former Smoker     Smokeless tobacco: Former User    Quit date: 1/1

## 2017-07-05 NOTE — ANESTHESIA POSTPROCEDURE EVALUATION
[address] Patient Status:  Hospital Outpatient Surgery   Age/Gender 80year old female MRN [id number]   [facility] SURGERY Attending Damien Root MD   Hosp Day # 0 PCP Tirso Arce MD       Anesthesia Post-op Note

## 2017-07-05 NOTE — PROGRESS NOTES
Medication list reviewed with LOC Saenz at [facility] in [place]. See medication list in epic.

## 2017-07-06 NOTE — PROGRESS NOTES
[place], 2/21/1927, 80year old, female is being discharged from Facility: [facility]    Date of Admission:  6.27.17    Date of Discharge:  Anticipated on 7.10.17                                 Admitting Diagnoses: pale, warm, dry; skin lesion suspicious for CA:  Oval, raised, lumpy, irregular shape, necrotic area noted.   WOUND:   LLE anterior mid-tibia surgical wound:  CDI dsg w/ ace wrap in place  EYES: PERRLA, EOMI, sclera anicteric, conjunctiva normal; there is n recent lab results:    Lab Results  Component Value Date    (H) 07/05/2017   BUN 18 07/05/2017   CREATSERUM 0.63 07/05/2017   GFR 79 07/05/2017   GFRNAA 82 01/03/2014   GFRAA 95 01/03/2014   CA 8.3 07/05/2017   ALKPHO 104 07/05/2017   AST 13 (L) 07/ HS  4. Nifedipine ER 30 mg daily  5. Torsemide 20 mg daily  6. Simvastatin 80 mg q HS  7. ASA as above  8. Pletal 50 mg BID     T2 DM  1. CHO controlled diet  2. Accu checks BID; notify if <70 or >300  3. Glimepiride 4 mg daily  4. Lantus 34u q HS  5.  Jerry

## 2017-07-07 PROBLEM — K92.2 UPPER GI BLEED: Status: ACTIVE | Noted: 2017-01-01

## 2017-07-07 PROBLEM — N28.9 ACUTE RENAL INSUFFICIENCY: Status: ACTIVE | Noted: 2017-01-01

## 2017-07-07 PROBLEM — D72.829 LEUKOCYTOSIS: Status: ACTIVE | Noted: 2017-01-01

## 2017-07-07 PROBLEM — I25.10 CORONARY ARTERY DISEASE INVOLVING NATIVE HEART WITHOUT ANGINA PECTORIS, UNSPECIFIED VESSEL OR LESION TYPE: Status: ACTIVE | Noted: 2017-01-01

## 2017-07-07 PROBLEM — E11.8 TYPE 2 DIABETES MELLITUS WITH COMPLICATION, WITHOUT LONG-TERM CURRENT USE OF INSULIN (HCC): Status: ACTIVE | Noted: 2017-01-01

## 2017-07-07 PROBLEM — E87.2 METABOLIC ACIDOSIS: Status: ACTIVE | Noted: 2017-01-01

## 2017-07-07 PROBLEM — A41.9 SEPSIS DUE TO UNDETERMINED ORGANISM (HCC): Status: ACTIVE | Noted: 2017-01-01

## 2017-07-07 PROBLEM — C44.92 SQUAMOUS CELL SKIN CANCER: Status: ACTIVE | Noted: 2017-01-01

## 2017-07-07 PROBLEM — D72.829 LEUKOCYTOSIS, UNSPECIFIED TYPE: Status: ACTIVE | Noted: 2017-01-01

## 2017-07-07 PROBLEM — I10 BENIGN ESSENTIAL HTN: Status: ACTIVE | Noted: 2017-01-01

## 2017-07-07 NOTE — H&P
[facility] HOSPITALIST  History and Physical     [facility] Patient Status:  Emergency    1927 MRN [id number]   Location [address] [facility] Attending Sameer Buck MD   Hosp Day # 0 PCP aVndana Pino MD     Chief Complaint: coffee gr SPECIMEN 1 SITE LEFT      Comment: benign  1/1/93: OTHER      Comment: angioplasty, tranluminal coronary  1/1/98: OTHER SURGICAL HISTORY      Comment: bladder sx  10/02: OTHER SURGICAL HISTORY      Comment: rotator cuff repair  3/07: OTHER SURGICAL HISTORY Disp: 30 g Rfl: 0   LANTUS SOLOSTAR 100 UNIT/ML Subcutaneous Solution Pen-injector INJECT 30 UNITS INTO THE SKIN DAILY.  (Patient taking differently: 34u daily) Disp: 9 mL Rfl: 12   CILOSTAZOL 50 MG Oral Tab TAKE 1 TABLET (50 MG TOTAL) BY MOUTH 2 (TWO) TIME 07/05/17   0720 07/07/17 0128   WBC  20.5*  40.6*   HGB  11.2*  13.3   MCV  94.0  90.3   PLT  335.0  513.0*   BAND  25  28   INR   --   1.34*       Recent Labs   Lab  07/05/17   0720 07/07/17 0127   GLU  170*  173*   BUN  18  42*   CREATSERUM  0.63

## 2017-07-07 NOTE — SEPSIS REASSESSMENT
[facility]    Sepsis Reassessment Note    /62   Pulse 109   Temp 98.4 °F (36.9 °C) (Temporal)   Resp 24   Ht 165.1 cm (5' 5\")   Wt 134 lb 0.6 oz (60.8 kg)   SpO2 98%   BMI 22.31 kg/m²      4:18 AM    Cardiac:  Regularity: Regular  Rate: Tachy

## 2017-07-07 NOTE — TELEPHONE ENCOUNTER
hospitalist called wants cz to know pt has been admitted with a small bowel obstruction.  Pt is critically ill  in th ICU  She is maxed out on LEVO  She has DNR / will  sooner than later

## 2017-07-07 NOTE — CONSULTS
[facility]                       Gastroenterology [address] [facility] Gastroenterology    Shilo Orourke Patient Status:  Inpatient    1927 MRN [id number]   [facility] 4SW-A Attending Kathryn Florian MD   Hosp Day # 0 PCP Mikayla Ventura bowel and her large bowel tapers to a narrowed area in the descending colon that extends to the rectum.   PMHx:   Past Medical History:   Diagnosis Date   • Actinic keratosis    • Atherosclerosis of coronary artery    • Hearing impairment    • Heart attack [] sodium chloride 0.9% IV bolus 1,824 mL 30 mL/kg Intravenous Continuous   LORazepam (ATIVAN) tab 0.5 mg 0.5 mg Oral Nightly PRN   0.9%  NaCl infusion  Intravenous Continuous   norepinephrine (LEVOPHED) 4 mg/250 ml premix infusion 0.5-30 mcg/min it is reported that the patient drinks alcohol on social occasions; there are no reports of IV drug use or other illicit substances  FamHx: The patient has no family history of colon cancer or other gastrointestinal malignancies;   No family history of ulce with the presence of hypoactive bowel sounds; No hepatosplenomegaly; no rebound or guarding;  No ascites is clinically apparent; no tympany to percussion; NG tube intact with scant opaque brown drainage  Ext: No peripheral edema or cyanosis; left lower extr atrophy, abnormal density, or significant focal lesion.    BILIARY:  Cholecystectomy.    PANCREAS:  Normal.  No lesion, fluid collection, ductal dilatation, or atrophy.    SPLEEN:  Normal.  No enlargement or focal lesion.    KIDNEYS:  Normal.  No mass, obs bilateral pleural effusions, right greater than left with atelectasis/consolidation, right greater than left, aspiration cannot be excluded, correlate clinically.     ED M.D. notified of these findings with preliminary radiology report from [facility] small bowel obstruction. She is currently unresponsive. She has a distended abdomen which is tympanic. She is currently on BIPAP. She was admitted for hypovolemic vs. Septic shock. She critically ill and currently on pressors and IV antibiotics.  She had a

## 2017-07-07 NOTE — CM/SW NOTE
Advised in ICU rounds this am that pt on bipap, maxing out on multiple pressors. Too unstable for surgical consideration. MD's updating family on critical illness and prognosis. CM/SW remain available for support as needed.   Yandel Toure, 07/07/17

## 2017-07-07 NOTE — ED NOTES
MULTIPLE AREAS OF ECCHIMOSIS TO BUE'S,M WITH MILD TO ;MOD EDEMA TO BLE'S. LLE WITH SL POST MOLD INPLACE+ CAP REFILL.  DECREASED ;M;OVEMENT

## 2017-07-07 NOTE — PLAN OF CARE
Diabetes/Glucose Control    • Glucose maintained within prescribed range Progressing        RESPIRATORY - ADULT    • Achieves optimal ventilation and oxygenation Progressing        Safety Risk - Non-Violent Restraints    • Patient will remain free from narcisa

## 2017-07-07 NOTE — ED NOTES
BLOOD CULTURES OBTAINED PRIOR TO ADMINISTRATION OF VANCOMYCIN BUT SCANNED AND SENT AFTER ABX INITIATED.

## 2017-07-07 NOTE — ED PROVIDER NOTES
Patient Seen in: [facility] Emergency Department    History   Patient presents with:  GI Bleeding (gastrointestinal)  Nausea/Vomiting/Diarrhea (gastrointestinal)    Stated Complaint: Daysi Ariza     HPI    25-year-old female with a history Comment: benign  1/1/93: OTHER      Comment: angioplasty, tranluminal coronary  1/1/98: OTHER SURGICAL HISTORY      Comment: bladder sx  10/02: OTHER SURGICAL HISTORY      Comment: rotator cuff repair  3/07: OTHER SURGICAL HISTORY      Comment: 3 stents  1 Hr,  TAKE 1 TABLET (30 MG TOTAL) BY MOUTH ONCE DAILY. SIMVASTATIN 80 MG Oral Tab,  TAKE 1 TABLET (80 MG TOTAL) BY MOUTH NIGHTLY.    ATENOLOL 50 MG Oral Tab,  TAKE ONE-HALF TABLET BY MOUTH NIGHTLY   glimepiride 4 MG Oral Tab,  TAKE 1 TABLET BY MOUTH DAILY bilaterally. There is no audible wheezes, Rales, rhonchi. Abdomen: Soft, nontender, mildly distended. There is bowel sounds throughout 4 quadrants. There is no guarding or rebound tenderness. Extremities: There is no clubbing, cyanosis, edema.   Patien components within normal limits   CBC W/ DIFFERENTIAL - Abnormal; Notable for the following:     WBC 35.1 (*)     RDW-SD 51.7 (*)     Neutrophil Absolute Prelim 31.31 (*)     All other components within normal limits   PTT, ACTIVATED - Normal    Narrative: ANTIBODY SCREEN[[id number]]                                  In process                   Please view results for these tests on the individual orders.    ANTIBODY SCREEN   RAINBOW DRAW BLUE   RAINBOW DRAW GOLD   RAINBOW DRAW LAVENDER   RAINBOW DRAW LIGHT monitor and pulse oximetry was applied. Patient had an IV established and labs were drawn. On presentation, patient noted to be slightly hypotensive with a blood pressure systolic of 450. She is tachycardic with a heart rate of 130 but afebrile.   Patien obstruction secondary to a mass or stricture. The mild circumferential wall thickening of the distal descending colon through rectum may represent a super imposed colitis. Colonoscopy recommended for further characterization.   Distended stomach with mode the absence of any obvious source of infection, patient was sent for CT scan of the abdomen and pelvis. CT scan showed evidence of a large bowel obstruction. Possible colitis. There is no obvious stricture noted or mass lesion.   Unclear etiology for her complication, without long-term current use of insulin ([facility] [address] [facility] E11.8 7/7/2017     Upper GI bleed K92.2 7/7/2017

## 2017-07-07 NOTE — TELEPHONE ENCOUNTER
Received a call from the hospitalist.  I understand her prognosis is poor. I spoke to her son Tyesha Jackson. He is at the hospital right now. He states that she did very well with her skin cancer removal a few days ago.   However she has been complaining about he

## 2017-07-07 NOTE — ED NOTES
PCT ICU WITH REPORT GIVEN TO AND ACCEPTED BY NEETA MONTERROSO.   PREPARED FOR TX TO ICU FROM CT PER MONITORED CART PER PROTOCOL

## 2017-07-07 NOTE — ED NOTES
MARKED CONFUSION TO TIME/ PLACE. REQUESTING PIECES OF BREAD TO BE CUT IN SMALL PIECES., TO APPROPRIATE RESONSES.

## 2017-07-07 NOTE — PROGRESS NOTES
[facility] HOSPITALIST  Progress Note     Radu Gudelia Patient Status:  Inpatient    1927 MRN [id number]   [facility] [place] 4SW-A Attending Mae Augustin MD   Hosp Day # 0 PCP Vandana Pino MD     Chief Complaint:   Sepsis, respir failure, the last 72 hours. Imaging: Imaging data reviewed in Epic.   MICRO:   Medications:   • piperacillin-tazobactam  3.375 g Intravenous Q8H   • Heparin Sodium (Porcine)  5,000 Units Subcutaneous Q8H [facility]   • pantoprazole (PROTONIX) IV push  40 mg Intraveno

## 2017-07-07 NOTE — CONSULTS
Critical Care H&P/Consult       NAME: Lisa Carroll - ROOM: [facility] - MRN: [id number] - Age: 80year old - :  1927    Date of Admission: 2017 12:43 AM  Admission Diagnosis: PVD (peripheral vascular disease) ([facility] [K74.9]  Metabolic acidosis [H70. Pneumonia, organism unspecified(486)    • Stroke [facility])     TIA   • Type II or unspecified type diabetes mellitus without mention of complication, not stated as uncontrolled    • Unspecified cataract    • Unspecified essential hypertension    • Visual impai insulin aspart (NOVOLOG FLEXPEN) 100 UNIT/ML Subcutaneous Solution Pen-injector Inject into the skin 4 (four) times daily before meals and nightly.  SS for correction AC and -[id number]>350 call Disp:  Rfl:  Taking   POTASSI can of soda and 1 cup of coffee daily    Exercise No     Social History Narrative   None on file        Family History:  Family History   Problem Relation Age of Onset   • Breast Cancer Daughter      in her 42's    • Heart Disorder Mother         Home Medi oz (63 kg)  07/06/17 : 134 lb (60.8 kg)  07/05/17 : 130 lb (59 kg)        Intake/Output Summary (Last 24 hours) at 07/07/17 0735  Last data filed at 07/07/17 0500   Gross per 24 hour   Intake             1000 ml   Output              900 ml   Net hours.    Invalid input(s): TROPI    INR   Date/Time Value Ref Range Status   07/07/2017 01:28 AM 1.34 (H) 0.89 - 1.11 Final   01/03/2014 01:55 AM 1.05 0.90 - 1.20 Final   Comment:   INR Therapeutic Interval Group A (2.00-3.00)  Venous Thrombosis, Pulmonar see  -cont NG to LIS  -NPO  5. Acute renal failure  -due to above  -cont supportive care- IVF/pressors, monitor indices and UOP  -check renal US if fxn does not improve  6.  Squamous cell Ca  -s/p recent excision/graft  -site eval'd by surgery no evidence o

## 2017-07-07 NOTE — PROGRESS NOTES
Updated on patient condition, now on BiPAP and nearing maximum pressor support with increasingly abnormal lactic acid. I outlined surgical and non-surgical management options with the patient's sons and daughter in law.  The patient is declining medically

## 2017-07-07 NOTE — CONSULTS
[facility]  Report of Consultation    Kirill Ogden Patient Status:  Inpatient    1927 MRN [id number]   [facility] 4SW-A Attending Luly Beck MD   Hosp Day # 0 PCP Alyssa Tijerina MD     Reason for Consultation:  Abdominal espinoza as well as large bowel that tapers to a narrowed area in the descending colon that extends to the rectum. This may represent an infectious or inflammatory process but underlying malignancy or mass lesion cannot be excluded.   I have reviewed the images as Age of Onset   • Breast Cancer Daughter      in her 42's    • Heart Disorder Mother       reports that she has quit smoking. She quit smokeless tobacco use about 22 years ago. She reports that she drinks alcohol. She reports that she does not use drugs. dementia. Cooperative. No apparent distress. HEENT: Exam is unremarkable. Without scleral icterus. Mucous membranes are dry. Oropharynx is clear. Neck: No tenderness to palpitation. No JVD. Supple. Lungs: Clear to auscultation bilaterally.   Dimin characterization. Distended stomach with moderate amount of refluxed fluid in the distal esophagus. Consider NG tube placement. Mild dependent groundglass opacity in the right and left lower lobes may represent atelectasis or aspiration.   Small right pl colon.    · The patient has responded to IV fluid resuscitation and vasopressor support. · Findings on CT scan are suggestive of an infectious colitis causing high-grade obstruction in the descending colon.   Malignant stricture and ischemia cannot be excl

## 2017-07-07 NOTE — PROGRESS NOTES
ICU  Critical Care APN Progress Note    NAME: Kirill Ogden - ROOM: [address] [place] [address] - MRN: [id number] - Age: 80year old - :1927    History Of Present Illness:  Kirill Ogden is a 80year old female with PMHx significant for actinic keratosis, cad, previous MI, General Appearance: Alert, cooperative, no distress, appears stated age  Neck: No JVD, neck supple, no adenopathy, trachea midline, no carotid bruits  Lungs: Clear to auscultation bilaterally, respirations unlabored  Heart: Regular rate and rhythm, S1 and

## 2017-07-07 NOTE — PLAN OF CARE
RESPIRATORY - ADULT    • Achieves optimal ventilation and oxygenation Not Progressing          Diabetes/Glucose Control    • Glucose maintained within prescribed range Progressing        Safety Risk - Non-Violent Restraints    • Patient will remain free fr

## 2017-07-07 NOTE — ED NOTES
POST MULTIPLE ATTEMPTS BOTH PER US AND PERIPHERAL  PIV TO BILAT HANDS AND R AC.  BLOODS AND CULTURES OBTAINED AND SENT TO LABS.

## 2017-07-08 NOTE — PROGRESS NOTES
[facility]PITAL[facility]  Progress Note     Hiren Gatica Patient Status:  Inpatient    1927 MRN [id number]   [facility] 4SW-A Attending Blayne Reza MD   Hosp Day # 1 PCP Lea Archibald MD     Chief Complaint:   Sepsis, respir failure, comfort care  3. Hypertensive  4. Recent aspiration pneumonia  5. Diabetes type 2  6. S/p left leg skin graft for squamous cell cancer    Cont comfort care at this time.     Spoke with family and RN.        Quality:  · DVT Prophylaxis: scd  · CODE status: D

## 2017-07-08 NOTE — PLAN OF CARE
Patient/Family Goals    • Patient/Family Short Term Goal Progressing        Pt on bipap this am. On levophed, Precedex, and Vaopressin. Nods head head yes to pain given Dilaudid x 1 with relief.  When both sons arrived they stated they want pt to be made co

## 2017-07-08 NOTE — PLAN OF CARE
Diabetes/Glucose Control    • Glucose maintained within prescribed range Progressing        Safety Risk - Non-Violent Restraints    • Patient will remain free from self-harm Progressing        Patient is full FNR, family at bedside, considering comfort car

## 2017-07-08 NOTE — PROGRESS NOTES
I have reviewed the patient's medical record, and the events of this morning. Per the family, the patient has been made comfort care and has been placed on a morphine drip. To that end, we will sign off.   Please call us back should there be a change in m

## 2017-07-08 NOTE — PROGRESS NOTES
Patient's family wishes are to transition to full comfort care. BIPAP removed and comfort order set placed. Vasopressors to be turned off once patient appears comfortable. Emotional support provided to family at bedside.      BETSEY Andrew-BC  ICU  Phone

## 2017-07-08 NOTE — PROGRESS NOTES
07/08/17 1633   Clinical Encounter Type   Visited With (Gissel Nash visited the patient, providing prayer, Scripture, support and Sacrament of the [address] [facility])   Routine Visit Introduction   Continue Visiting No

## 2017-07-09 NOTE — DISCHARGE SUMMARY
[facility] HOSPITALIST  DISCHARGE SUMMARY     Kay Singh Patient Status:  Inpatient    1927 MRN [id number]   [facility] 4SW-A Attending No att. providers found   [facility] Day # 1 PCP Ana M Maldonado MD     Date of Admission: 2017  Date of and she was treated with antibiotics and her mental status returned to baseline. Patient was discharged to subacute rehab. She was admitted by Dr. park for the above procedure and discharged to subacute rehab.   She presents emergency department with comp Chew  Commonly known as:  TUMS      Chew 2 tablets by mouth 4 (four) times daily as needed (upset stomach). Refills:  0     Calcium Carbonate-Vitamin D 600-400 MG-UNIT Tabs      Take 1 tablet by mouth 2 (two) times daily.    Refills:  0     cilostazol 50 TABLETS BY MOUTH EVERY DAY   Quantity:  180 tablet  Refills:  1     simvastatin 80 MG Tabs  Commonly known as:  ZOCOR      TAKE 1 TABLET (80 MG TOTAL) BY MOUTH NIGHTLY.    Quantity:  90 tablet  Refills:  1     torsemide 20 MG Tabs  Commonly known as:  DEMAD

## 2023-09-08 NOTE — PLAN OF CARE
Problem: Pain  Goal: Verbalizes/displays adequate comfort level or baseline comfort level  9/8/2023 0735 by Ana Francisco RN  Outcome: Progressing  Flowsheets (Taken 9/7/2023 0800 by Zara Zendejas, RN)  Verbalizes/displays adequate comfort level or baseline comfort level:   Encourage patient to monitor pain and request assistance   Assess pain using appropriate pain scale     Problem: Safety - Adult  Goal: Free from fall injury  9/8/2023 0735 by Ana Francisco RN  Outcome: Progressing  Flowsheets (Taken 9/6/2023 2318 by Brandin Arevalo RN)  Free From Fall Injury:   Instruct family/caregiver on patient safety   Based on caregiver fall risk screen, instruct family/caregiver to ask for assistance with transferring infant if caregiver noted to have fall risk factors     Problem: ABCDS Injury Assessment  Goal: Absence of physical injury  9/8/2023 0735 by Ana Francisco RN  Outcome: Progressing     Problem: Skin/Tissue Integrity  Goal: Absence of new skin breakdown  Description: 1. Monitor for areas of redness and/or skin breakdown  2. Assess vascular access sites hourly  3. Every 4-6 hours minimum:  Change oxygen saturation probe site  4. Every 4-6 hours:  If on nasal continuous positive airway pressure, respiratory therapy assess nares and determine need for appliance change or resting period.   9/8/2023 0735 by Ana Francisco RN  Outcome: Progressing     Problem: Discharge Planning  Goal: Discharge to home or other facility with appropriate resources  9/8/2023 0735 by Ana Francisco RN  Outcome: Progressing  Flowsheets (Taken 9/7/2023 0800 by Zara Zendejas, DYLAN)  Discharge to home or other facility with appropriate resources: Identify barriers to discharge with patient and caregiver Received report at 0730. Patient alert and oriented x3. Midline placed due to poor peripheral access. No complaints of pain. Able to follow directions. PT/OT recommends rehab for patient. Lemons removed per order.  Passed swallow evaluation; soft chopped Cloteal Bonus

## (undated) DEVICE — LOWER EXTREMITY CDS-LF: Brand: MEDLINE INDUSTRIES, INC.

## (undated) DEVICE — DERMATOME BLADES: Brand: DERMATOME

## (undated) DEVICE — CONVERTORS STOCKINETTE: Brand: CONVERTORS

## (undated) DEVICE — CAUTERY BLADE 2IN INS E1455

## (undated) DEVICE — PROXIMATE SKIN STAPLERS (35 WIDE) CONTAINS 35 STAINLESS STEEL STAPLES (FIXED HEAD): Brand: PROXIMATE

## (undated) DEVICE — GLOVE SURG SENSICARE SZ 8

## (undated) DEVICE — SHEET, DRAPE, SPLIT, STERILE: Brand: MEDLINE

## (undated) DEVICE — GLOVE SURG TRIUMPH SZ 71/2

## (undated) DEVICE — DRAPE TABLE COVER 44X90 TC-10

## (undated) DEVICE — MEDI-VAC SUCTION FINE CAPACITY: Brand: CARDINAL HEALTH

## (undated) DEVICE — Device: Brand: PLUMEPEN

## (undated) DEVICE — ST. TONGUE BLADES: Brand: DEROYAL

## (undated) DEVICE — SOL  .9 1000ML BTL

## (undated) DEVICE — MARKER SKIN 2 TIP

## (undated) DEVICE — KENDALL SCD EXPRESS SLEEVES, KNEE LENGTH, MEDIUM: Brand: KENDALL SCD

## (undated) NOTE — IP AVS SNAPSHOT
[facility] [place] [address] [place] [address], [address] ~ [phone]                Discharge Summary   2/5/2017    Ms. Brooke Rincon           Admission Information        Provider Department    2/5/2017 Yelena Johnson MD  4nw-A         [facility] Take 1 tablet (50 mg total) by mouth 2 (two) times daily. Mitchell Vega                              glimepiride 4 MG Tabs   Commonly known as:  AMARYL   Next dose due:  tomorrow        TAKE 1 TABLET BY MOUTH DAILY WITH BREAKFAST.     Lexis Farias Feb 09, 2017 10:30 AM   Exam - Established Patient with Celina Membreno MD   [place] [address], [place] ([address] [facility])    [address] [place] [address]   [phone]              Immunization H None         Additional Information       We are concerned for your overall well being:    - If you are a smoker or have smoked in the last 12 months, we encourage you to explore options for quitting.     - If you have concerns related to behavioral healt torsemide 20 MG Oral Tab       Use: Treat high blood pressure, swelling in legs, too much fluid    Most common side effects: Dizziness, loss of potassium (increased urination is expected)   What to report to your healthcare team: Dizziness, decreased urin Most common side effects:  Depends on the specific medication but generally include: diarrhea, constipation, headache, allergic reaction (itching, rash, hives)   What to report to your healthcare team: Pain, nausea/vomiting, no bowel movement in 2+ days, d

## (undated) NOTE — IP AVS SNAPSHOT
Patient Demographics     Address  [address] [address] Phone  [phone] [facility]) *Preferred*      Emergency Contact(s)     Name Relation Home Work Brooke Amado [phone]    Hanny Colon  [phone] Aravind Schumacher MD         nystatin [id number] UNIT/GM Crea  Commonly known as:  MYCOSTATIN      Apply 1 Application topically 2 (two) times daily.    Aravind Schumacher MD         Potassium Chloride ER 10 MEQ Tbcr  Commonly known as:  K-DUR      TAKE 2 TABLETS B [id number] potassium chloride IVPB premix 20 mEq (Followed by Linked Group #1) 06/27/17 1238 New Bag            LEFT UPPER ABDOMEN     Order ID Medication Name Action Time Action Reason Comments    [id number] insulin aspart (NOVOLOG) 100 UNIT/ML flexpen 1-5 .0 150.0 - 450.0 10(3)uL — TV Pixie Performed By     Lab - Abbreviation Name Director Address Valid Date Range    [address] Laina Ball  [phone] [id number] 02/03/16 1801 - Pre became more confused and was unable to state her name or birthday. Per family, patient had not mentioned anything regarding headaches or blurry vision. No other complaints at this time.      Past Medical History:  Past Medical History:   Diagnosis Date   • Metformin                   Comment:diarrhea    Medications:    No current facility-administered medications on file prior to encounter.    Current Outpatient Prescriptions on File Prior to Encounter:  Glucose Blood (FREESTYLE LITE TEST) In Vitro Strip TEST BP (!) 181/94   Pulse 108   Temp 98.5 °F (36.9 °C) (Temporal)   Resp 20   Ht 5' 6\" (1.676 m)   Wt 139 lb 15.9 oz (63.5 kg)   SpO2 98%   BMI 22.60 kg/m²   General: No acute distress. Confused   HEENT: Normocephalic atraumatic. Moist mucous membranes.  EOM-I NG.1 Savita Arias DO on 6/24/2017  6:10 PM                        Consults - MD Consult Notes      Consults signed by Ila Nina MD at 6/25/2017 12:28 PM     Author:  Ila Nina MD Service:  Infectious Disease Author Type:  Physician 1/1/75: HYSTERECTOMY  92/93: LASER SURGERY OF EYE      Comment: cataract  2002: JULIO NEEDLE LOCALIZATION W/ SPECIMEN 1 SITE LEFT      Comment: benign  1/1/93: OTHER      Comment: angioplasty, tranluminal coronary  1/1/98: OTHER SURGICAL HISTORY      Comment Subcutaneous, TID CC and HS  •  CefTRIAXone Sodium (ROCEPHIN) 2 g in sodium chloride 0.9 % 100 mL IVPB-minibag, 2 g, Intravenous, Q12H    Review of Systems:    A comprehensive 10 point review of systems was completed.   Pertinent positives and negatives not DM (diabetes mellitus) with peripheral vascular complication (HCC)     At risk for falling     Iliac artery stenosis, bilateral (HCC)     Transient cerebral ischemia     Visual changes     Migraine equivalent     Dermatofibroma of elbow     Seborrheic k History related to current admission: pt admitted due to fall at home with confusion, found by family. CT (-) for acute process. Pt with h/o htn, tia, dm, pvd, mac degeneration, cad.  Pt family report pt is scheduled for removal of skin CA on her leg next Lives With: Alone  Drives: No  Patient Owned Equipment: Rolling walker  Patient Regularly Uses: Glasses; Hearing aides    Prior Level of [place]: pt lives alone in second floor apt with elevator.   Pt confused, son present and assisting with informati Coordination - Heel to [place]: Left decreased speed;Left decreased accuracy             ACTIVITY TOLERANCE  Room air  No shortness of breath    AM-PAC '6-Clicks' INPATIENT SHORT FORM - BASIC MOBILITY  How much difficulty does the patient currently have. ..   - present. Discussed with son and DIL recommendation for MARIANELA as pt is not safe to live alone at this time.       Exercise/Education Provided:  Bed mobility  Energy conservation  Functional activity tolerated  Gait training  Posture  Transfer training    [place] PT Treatment Plan: Bed mobility; Endurance; Energy conservation;Patient education; Family education;Gait training;Neuromuscular re-educate;Strengthening;Transfer training;Balance training  Rehab Potential : Fair  Frequency (Obs): 5x/week  Number of Visits to Principal Problem:    Altered mental status, unspecified altered mental status type  Active Problems:    Altered mental status    Hypernatremia    Hypokalemia    Fever    PVD (peripheral vascular disease) [facility])      Past Medical History  Past Medical Histo with ADL. Used RW or cane for ambulation. Pt enjoys playing Cuil every week. Son drives the pt to stores and assists with running errands. Pt used to enjoy sewing and making dressed. SUBJECTIVE  \"I used to sew all these dresses before. \"      Ayala Loges -   Putting on and taking off regular lower body clothing?: A Lot  -   Bathing (including washing, rinsing, drying)?: A Lot  -   Toileting, which includes using toilet, bedpan or urinal? : A Little  -   Putting on and taking off regular upper body clothing deficits, maximizing patient's ability to return to prior level of function. Based on -[facility] Daily Living Assessment, pt presents with 53.32% degree of ADL impairment.   Research supports that pt with this level of impairment often benefit from discharging SPEECH-LANGUAGE PATHOLOGIST    Filed:  6/27/2017  1:18 PM Date of Service:  6/27/2017  1:08 PM Status:  Signed    :  DAVID Villa (SPEECH-LANGUAGE PATHOLOGIST)         ADULT VIDEOFLUOROSCOPIC SWALLOWING STUDY    Admission Date: 6/24/2017 Current Diet Consistency: Mechanical soft chopped; Nectar thick liquids  Prior Level of Function: Independent  Prior Living Situation: Home alone  History of Recent: No recent respiratory difficulty  Precautions: Aspiration      Reason for Referral: R/O asp Pt transported via wheelchair; alert & participatory; able to follow simple commands; difficulty with more complex.   Results of exam revealed a mild-moderate pharyngeal dysphagia characterized by the following:  Adequate retrieval & containment; mild-min d implementation of aspiration precautions and swallow strategies independently over 2 session(s).    Goal #3 The patient will utilize compensatory strategies as outlined by  BSSE (clinical evaluation) including Slow rate, Small bites, Small sips, Alternate l

## (undated) NOTE — MR AVS SNAPSHOT
[address]  [address] [id number] [phone]               Thank you for choosing us for your health care visit with Orquidea Kelley MD.  We are glad to serve you and happy to provide you with this TAKE 1 TABLET (50 MG TOTAL) BY MOUTH 2 (TWO) TIMES DAILY.    Commonly known as:  PLETAL           FREESTYLE LANCETS Misc           FREESTYLE LITE TEST Strp   Generic drug:  Glucose Blood           glimepiride 4 MG Tabs   TAKE 1 TABLET BY MOUTH DAILY WITH BR

## (undated) NOTE — MR AVS SNAPSHOT
[address]  [address] [address] [phone]               Thank you for choosing us for your health care visit with Vandana Pino MD.  We are glad to serve you and happy to provide you with this ofloxacin 0.3 % Soln   Commonly known as:  OCUFLOX           Potassium Chloride ER 10 MEQ Tbcr   Take 2 tablets (20 mEq total) by mouth daily.    Commonly known as:  K-DUR           Psyllium 28.3 % Powd   1 rounded tablespoon daily in 8 ounces of water ? If you have pets, be careful that you don’t trip over them. OUTSIDE SAFETY TIPS  ? Always wear good shoes with proper support and traction. ? Always use hand rails on stairs and escalators. ? Cover porch steps with gritty weather proof paint.   ? Pay

## (undated) NOTE — LETTER
[facility]  Tiki Nyshayla 61 [address] [facility], [address] [place] [address]    Consent for Operation    Date: __________________    Time: _______________    1.  I authorize the performance upon Kareem Oas the following operation:    Procedure(s):  EXCISION OF SQUAMOUS CELL procedure has been videotaped, the surgeon will obtain the original videotape. The hospital will not be responsible for storage or maintenance of this tape.     6. For the purpose of advancing medical education, I consent to the admittance of observers to t STATEMENTS REQUIRING INSERTION OR COMPLETION WERE FILLED IN.     Signature of Patient:   ___________________________    When the patient is a minor or mentally incompetent to give consent:  Signature of person authorized to consent for patient: ____________ drugs/illegal medications). Failure to inform my anesthesiologist about these medicines may increase my risk of anesthetic complications. · If I am allergic to anything or have had a reaction to anesthesia before.     3. I understand how the anesthesia med I have discussed the procedure and information above with the patient (or patient’s representative) and answered their questions. The patient or their representative has agreed to have anesthesia services.     _______________________________________________

## (undated) NOTE — MR AVS SNAPSHOT
[address]  [address] [facility], [address] [id number] [phone]               Thank you for choosing us for your health care visit with Angelo Angel MD.  We are glad to serve you and happy to provide you with this Current Medications          This list is accurate as of: 4/27/17 11:05 AM.  Always use your most recent med list.                Amoxicillin-Pot Clavulanate 500-125 MG Tabs   Take 1 tablet by mouth 2 (two) times daily.    Commonly known as:  AUGMENTIN - Amoxicillin-Pot Clavulanate 500-125 MG Tabs  - nystatin [id number] UNIT/GM Crea            MyChart     Call the [employer] for assistance with your inactive [employer] account    If you have questions, you can call [phone] to talk to our Mateo Merrill

## (undated) NOTE — IP AVS SNAPSHOT
[address]            (For Outpatient Use Only) Initial Admit Date: 6/24/2017   Inpt/Obs Admit Date: Inpt: 6/24/17 / Obs: N/A   Discharge Date:    Yue Radha:  [de-identified]   MRN: [de-identified]   CSN: [id number]        ENCOUNTER  Patient Subscriber Name:  Sneha Pruett DOB:    Subscriber ID:  Pt Rel to Subscriber:    Hospital Account Financial Class: Medicare    2017